# Patient Record
Sex: MALE | Race: WHITE | NOT HISPANIC OR LATINO | Employment: OTHER | URBAN - NONMETROPOLITAN AREA
[De-identification: names, ages, dates, MRNs, and addresses within clinical notes are randomized per-mention and may not be internally consistent; named-entity substitution may affect disease eponyms.]

---

## 2020-09-16 ENCOUNTER — OFFICE VISIT (OUTPATIENT)
Dept: NEUROLOGY | Facility: CLINIC | Age: 60
End: 2020-09-16

## 2020-09-16 VITALS
HEIGHT: 69 IN | WEIGHT: 265 LBS | DIASTOLIC BLOOD PRESSURE: 70 MMHG | BODY MASS INDEX: 39.25 KG/M2 | TEMPERATURE: 97.1 F | HEART RATE: 65 BPM | SYSTOLIC BLOOD PRESSURE: 100 MMHG | OXYGEN SATURATION: 94 %

## 2020-09-16 DIAGNOSIS — F17.210 CIGARETTE SMOKER: ICD-10-CM

## 2020-09-16 DIAGNOSIS — G43.011 INTRACTABLE MIGRAINE WITHOUT AURA AND WITH STATUS MIGRAINOSUS: Primary | ICD-10-CM

## 2020-09-16 DIAGNOSIS — G43.719 INTRACTABLE CHRONIC MIGRAINE WITHOUT AURA AND WITHOUT STATUS MIGRAINOSUS: ICD-10-CM

## 2020-09-16 PROCEDURE — 99214 OFFICE O/P EST MOD 30 MIN: CPT | Performed by: NURSE PRACTITIONER

## 2020-09-16 RX ORDER — FINASTERIDE 5 MG/1
TABLET, FILM COATED ORAL
COMMUNITY
Start: 2020-08-20

## 2020-09-16 RX ORDER — PROPRANOLOL HYDROCHLORIDE 20 MG/1
TABLET ORAL
COMMUNITY
Start: 2020-08-20

## 2020-09-16 RX ORDER — FLUTICASONE PROPIONATE 110 UG/1
1 AEROSOL, METERED RESPIRATORY (INHALATION)
COMMUNITY

## 2020-09-16 RX ORDER — ALBUTEROL SULFATE 2.5 MG/3ML
2.5 SOLUTION RESPIRATORY (INHALATION) EVERY 4 HOURS PRN
COMMUNITY

## 2020-09-16 RX ORDER — GABAPENTIN 800 MG/1
TABLET ORAL
COMMUNITY
Start: 2020-09-10

## 2020-09-16 RX ORDER — CETIRIZINE HYDROCHLORIDE 10 MG/1
TABLET ORAL
COMMUNITY
Start: 2020-09-08

## 2020-09-16 RX ORDER — BLOOD-GLUCOSE METER
KIT MISCELLANEOUS
COMMUNITY
Start: 2020-09-04

## 2020-09-16 RX ORDER — TRAMADOL HYDROCHLORIDE 50 MG/1
TABLET ORAL
COMMUNITY
Start: 2020-09-10

## 2020-09-16 RX ORDER — MONTELUKAST SODIUM 10 MG/1
TABLET ORAL
COMMUNITY
Start: 2020-09-04 | End: 2022-11-07

## 2020-09-16 RX ORDER — CYCLOBENZAPRINE HCL 10 MG
TABLET ORAL
COMMUNITY
Start: 2020-09-04

## 2020-09-16 RX ORDER — TAMSULOSIN HYDROCHLORIDE 0.4 MG/1
CAPSULE ORAL
COMMUNITY
Start: 2020-09-05

## 2020-09-16 RX ORDER — FAMOTIDINE 20 MG/1
TABLET, FILM COATED ORAL
COMMUNITY
Start: 2020-08-20

## 2020-09-16 RX ORDER — DULOXETIN HYDROCHLORIDE 30 MG/1
CAPSULE, DELAYED RELEASE ORAL
COMMUNITY
Start: 2020-09-04

## 2020-09-16 RX ORDER — LUBIPROSTONE 24 UG/1
24 CAPSULE ORAL DAILY
COMMUNITY
End: 2022-11-07

## 2020-09-16 NOTE — PROGRESS NOTES
New Headache Note      Patient Name: Jonnie Blanton  : 1960   MRN: [id number]     Referring Physician: Suma Boyer MD    Chief Complaint:    Chief Complaint   Patient presents with   • Follow-up     Patient in office to follow up on migraines       History of Present Illness: Jonnie Blanton is a 59 y.o. male who is here today to establish care with Neurology for chronic migraines.  He is accompanied by his wife today.  He has had chronic migraines since he was in his 20s.  He was last seen by me at [facility] on 2020 and had his first set of Botox injections.  He had significant improvement in his migraines after the Botox injections.  He had approximately 75% improvement in his migraines after the Botox injections.  He describes his migraines as involving his entire head and accompanied by photophobia and phonophobia.  He has been miserable the past couple of months.  He has had 30/30 headache days in the past month.  He was taking Ubrelvy in the past with some improvement but was unable to get another prescription.  He would like to do Botox injections again.  Additional risk factors- BMI 39, x3 back surgeries, chronic back pain, fibromyalgia, GERD, cigarette smoker, asthma.  [place] sleepiness scale score of 3 today.     Headache days in the last month: 30  Number of migraines in the last month: at least 20  Migraine pattern: varies  Migraine triggers: none identified  Preventative medications tried: Topiramate no improvement, Emgality, Propranolol caused dizziness, Nortriptyline, Aimovig no improvement, Ajovy with some improvement for 30 days following the 90-day injection  Abortive medications tried: Ubrelvy did help, Sumatriptan no improvement, Tramadol, Maxalt no improvement, Tylenol, Ibuprofen, Lortab  Last MRI or CT head:     Pertinent Medical History: Therapy and ESR on 2020 were noncontributory.  He has refused a sleep study in the past, multiple times, even  though he is at high risk for ADOLFO-he says his insurance company will not pay for one.    Procedure-after an informed consent was obtained and the procedure was explained to the patient a total of 165 units of Botox were injected as per protocol- 10 units in 2 sites, procerus 5 units in 1 site, frontalis 20 units in 4 sites, temporalis 40 units in 8 sites, occipitalis 30 units in 6 sites, cervical paraspinal 30 units in 4 sites, trapezius 30 units in 6 sites, total of 165 units and 31 sites.  The patient tolerated the procedure well without complications.  *Due to the severity of the patient's migraines I have administered Botox injections via sample vial.       Subjective      Review of Systems:   Review of Systems   Constitutional: Negative for chills, fatigue, fever, unexpected weight gain and unexpected weight loss.   HENT: Negative for facial swelling, hearing loss, sore throat, swollen glands, tinnitus and trouble swallowing.    Eyes: Negative for blurred vision, double vision, photophobia and visual disturbance.   Respiratory: Negative for cough, chest tightness and shortness of breath.    Cardiovascular: Negative for chest pain, palpitations and leg swelling.   Gastrointestinal: Negative for constipation, diarrhea and nausea.   Endocrine: Negative for cold intolerance and heat intolerance.   Musculoskeletal: Negative for gait problem, neck pain and neck stiffness.   Skin: Negative for color change and rash.   Allergic/Immunologic: Negative for environmental allergies and food allergies.   Neurological: Positive for headache. Negative for dizziness, syncope, facial asymmetry, speech difficulty, weakness, light-headedness, numbness, memory problem and confusion.   Psychiatric/Behavioral: Negative for agitation, behavioral problems, sleep disturbance and depressed mood. The patient is not nervous/anxious.        Past Medical History:   Past Medical History:   Diagnosis Date   • Back pain    •  Difficulty urinating    • Environmental allergies    • Generalized headaches    • Multiple food allergies    • Postnasal drip    • Sinus pressure    • Sleep disturbance    • Tinnitus    • Trouble swallowing        Past Surgical History:   Past Surgical History:   Procedure Laterality Date   • BACK SURGERY     • CARPAL TUNNEL RELEASE      carpal tunnel (right Elbow)    • ULNAR NERVE REPAIR      Ulna Nerve reposition        Family History:   Family History   Problem Relation Age of Onset   • Arthritis Mother    • Cancer Mother    • Hypertension Mother    • Kidney disease Mother    • Thyroid disease Mother    • Diabetes Father    • Heart disease Father    • Hyperlipidemia Father        Social History:   Social History     Socioeconomic History   • Marital status:      Spouse name: Not on file   • Number of children: Not on file   • Years of education: Not on file   • Highest education level: Not on file   Tobacco Use   • Smoking status: Current Every Day Smoker   • Smokeless tobacco: Never Used   Substance and Sexual Activity   • Alcohol use: Never     Frequency: Never   • Drug use: Never   • Sexual activity: Defer       Medications:     Current Outpatient Medications:   •  albuterol (PROVENTIL) (2.5 MG/3ML) 0.083% nebulizer solution, Take 2.5 mg by nebulization Every 4 (Four) Hours As Needed for Wheezing., Disp: , Rfl:   •  Albuterol (VENTOLIN IN), Inhale 90 mcg., Disp: , Rfl:   •  cetirizine (zyrTEC) 10 MG tablet, , Disp: , Rfl:   •  cyclobenzaprine (FLEXERIL) 10 MG tablet, , Disp: , Rfl:   •  DULoxetine (CYMBALTA) 30 MG capsule, , Disp: , Rfl:   •  famotidine (PEPCID) 20 MG tablet, , Disp: , Rfl:   •  finasteride (PROSCAR) 5 MG tablet, , Disp: , Rfl:   •  fluticasone (FLOVENT HFA) 110 MCG/ACT inhaler, Inhale 1 puff 2 (Two) Times a Day., Disp: , Rfl:   •  FREESTYLE LITE test strip, , Disp: , Rfl:   •  gabapentin (NEURONTIN) 800 MG tablet, , Disp: , Rfl:   •  lubiprostone (AMITIZA) 24 MCG capsule, Take 24 mcg  "by mouth Daily., Disp: , Rfl:   •  metFORMIN (GLUCOPHAGE) 500 MG tablet, , Disp: , Rfl:   •  montelukast (SINGULAIR) 10 MG tablet, , Disp: , Rfl:   •  propranolol (INDERAL) 20 MG tablet, , Disp: , Rfl:   •  tamsulosin (FLOMAX) 0.4 MG capsule 24 hr capsule, , Disp: , Rfl:   •  traMADol (ULTRAM) 50 MG tablet, , Disp: , Rfl:     Allergies:   Allergies   Allergen Reactions   • Advil [Ibuprofen] Other (See Comments)     NUMBNESS OF TONGUE AND LIPS.   • Codeine Other (See Comments)     PATIENT STATES NUMBNESS IN TONGUE AND LIPS. VERY HYPER    • Tylenol [Acetaminophen] Other (See Comments)     NUMBNESS OF LIPS AND TOUNGE.       Objective     Physical Exam:  Vital Signs:   Vitals:    09/16/20 1302   BP: 100/70   Pulse: 65   Temp: 97.1 °F (36.2 °C)   SpO2: 94%   Weight: 120 kg (265 lb)   Height: 175.3 cm (69\")   PainSc: 10-Worst pain ever   PainLoc: Generalized     Body mass index is 39.13 kg/m².     Physical Exam  Vitals signs and nursing note reviewed.   Constitutional:       General: He is not in acute distress.     Appearance: He is well-developed. He is not diaphoretic.   HENT:      Head: Normocephalic and atraumatic.   Eyes:      Conjunctiva/sclera: Conjunctivae normal.      Pupils: Pupils are equal, round, and reactive to light.   Neck:      Musculoskeletal: Normal range of motion and neck supple.   Cardiovascular:      Rate and Rhythm: Normal rate and regular rhythm.      Heart sounds: Normal heart sounds. No murmur. No friction rub. No gallop.    Pulmonary:      Effort: Pulmonary effort is normal. No respiratory distress.      Breath sounds: Normal breath sounds. No wheezing or rales.   Musculoskeletal: Normal range of motion.   Skin:     General: Skin is warm and dry.      Findings: No rash.   Neurological:      Mental Status: He is alert and oriented to person, place, and time.      Coordination: Finger-Nose-Finger Test and Romberg Test normal.      Gait: Gait is intact. Tandem walk normal.   Psychiatric:         " Speech: Speech normal.         Behavior: Behavior normal.         Thought Content: Thought content normal.         Neurologic Exam     Mental Status   Oriented to person, place, and time.   Attention: normal. Concentration: normal.   Speech: speech is normal   Level of consciousness: alert  Knowledge: good.     Cranial Nerves   Cranial nerves II through XII intact.     CN II   Visual fields full to confrontation.     CN III, IV, VI   Pupils are equal, round, and reactive to light.  Right pupil: Size: 2 mm. Shape: regular. Reactivity: brisk.   Left pupil: Size: 2 mm. Shape: regular. Reactivity: brisk.   CN III: no CN III palsy  CN VI: no CN VI palsy  Nystagmus: none     CN V   Facial sensation intact.     CN VII   Facial expression full, symmetric.     CN VIII   CN VIII normal.     CN IX, X   CN IX normal.   CN X normal.     CN XI   CN XI normal.     CN XII   CN XII normal.     Motor Exam   Muscle bulk: normal  Overall muscle tone: normal    Strength   Right biceps: 5/5  Left biceps: 5/5  Right triceps: 5/5  Left triceps: 5/5  Right quadriceps: 5/5  Left quadriceps: 5/5  Right hamstrin/5  Left hamstrin/5    Sensory Exam   Light touch normal.     Gait, Coordination, and Reflexes     Gait  Gait: normal    Coordination   Romberg: negative  Finger to nose coordination: normal  Tandem walking coordination: normal    Tremor   Resting tremor: absent  Intention tremor: absent  Action tremor: absentAmbulatory with the assistance of a rolling walker. Able to go from sitting to standing position on first attempt without assistance.        Assessment / Plan      Assessment/Plan:   Jonnie was seen today for follow-up.    Diagnoses and all orders for this visit:    Intractable migraine without aura and with status migrainosus  - Return to the clinic in 3 months for Botox injections.   - Samples of Ubrelvy provided today.     BMI 39.0-39.9,adult  - The patient was counseled on goals and the need for weight reduction. They  were directed to the NIH's website on weight management, (https://www.niddk.nih.gov/health-information/weight-management/health-tips-adults) which addresses the risks of being overweight or obese, a healthy diet, tips for losing weight, and the benefit of physical activity/exercise.         Follow Up:   Return in about 3 months (around 12/16/2020) for Botox.    CASEY Schreiber, FNP-C  [facility]       Please note that portions of this note may have been completed with a voice recognition program. Efforts were made to edit the dictations, but occasionally words are mistranscribed.

## 2020-10-05 ENCOUNTER — TELEPHONE (OUTPATIENT)
Dept: NEUROLOGY | Facility: CLINIC | Age: 60
End: 2020-10-05

## 2020-10-05 NOTE — TELEPHONE ENCOUNTER
PT'S SPOUSE CALLED STATING PT'S UBRELVY RX NEEDS A PA AND SHE WAS CALLING TO CHECK THE STATUS OF THE PA. PLEASE ADVISE      CALL BACK- [phone]

## 2020-10-08 NOTE — TELEPHONE ENCOUNTER
Insurance denied Ubrelvy due to non-formulary on insurance plan. Denial was given to Provider.     Attempted to contact patient's wife to let her know.     No answer when calling patient, Left message for her to return my call.

## 2020-10-20 ENCOUNTER — TELEPHONE (OUTPATIENT)
Dept: NEUROLOGY | Facility: CLINIC | Age: 60
End: 2020-10-20

## 2020-10-20 DIAGNOSIS — G43.011 INTRACTABLE MIGRAINE WITHOUT AURA AND WITH STATUS MIGRAINOSUS: Primary | ICD-10-CM

## 2020-10-20 NOTE — TELEPHONE ENCOUNTER
Patient Ubrelvy was denied due to non-formulary.     Would you like to prescribe something else or do an appeal?

## 2020-12-17 ENCOUNTER — PROCEDURE VISIT (OUTPATIENT)
Dept: NEUROLOGY | Facility: CLINIC | Age: 60
End: 2020-12-17

## 2020-12-17 VITALS
HEART RATE: 58 BPM | SYSTOLIC BLOOD PRESSURE: 128 MMHG | RESPIRATION RATE: 18 BRPM | WEIGHT: 269 LBS | DIASTOLIC BLOOD PRESSURE: 78 MMHG | HEIGHT: 69 IN | OXYGEN SATURATION: 99 % | BODY MASS INDEX: 39.84 KG/M2 | TEMPERATURE: 96.9 F

## 2020-12-17 DIAGNOSIS — G43.719 INTRACTABLE CHRONIC MIGRAINE WITHOUT AURA AND WITHOUT STATUS MIGRAINOSUS: Primary | ICD-10-CM

## 2020-12-17 PROCEDURE — 64615 CHEMODENERV MUSC MIGRAINE: CPT | Performed by: NURSE PRACTITIONER

## 2020-12-17 RX ORDER — MELOXICAM 15 MG/1
15 TABLET ORAL DAILY
COMMUNITY

## 2020-12-17 NOTE — PROGRESS NOTES
Botox Procedure Note       Patient Name: Jonnie Blanton  : 1960   MRN: [id number]     Chief Complaint:    Chief Complaint   Patient presents with   • Procedure     patient in office for botox injections. Patient supplied NDC 7417-1011-19       History of Present Illness: Jonnie Blanton is a 60 y.o. male who is here today for Botox injections for migraines.  He has had some improvement since his last set of injections.  He has only had 8 severe migraines in the past month.  He estimates his improvement to be about 70%.  He has taken Ubrelvy with improvement but his insurance company will not pay for that.  He tried Nurtec without improvement.  He is not a candidate for triptans due to his cardiac history.  He has taken Aimovig in the past and feels he had really good results with that and the Botox injections.     We have discussed risk and benefits of this Botox procedure and common side effects including headache, neck pain, neck stiffness or weakness, ptosis, flu-like symptoms as well as more serious possible adverse effects including possible dysphagia, respiratory distress or even death (death has only been reported once with adults for Botox for migraines in another state when mixed with lidocaine solution which we do not use lidocaine solution in our practice for mixing Botox). The patient verbally understands and accepts risks and agrees with moving forward with Botox injections for chronic migraine prevention.    Verbal and written consent has been obtained from the patient prior to beginning treatment injections.     Subjective      Review of Systems:   Review of Systems   Neurological: Positive for headache.       The following portions of the patient's history were reviewed an updated as appropriate: past family history, past medical history, past social history, past surgical history.     Medications:     Current Outpatient Medications:   •  albuterol (PROVENTIL) (2.5 MG/3ML) 0.083% nebulizer  solution, Take 2.5 mg by nebulization Every 4 (Four) Hours As Needed for Wheezing., Disp: , Rfl:   •  Albuterol (VENTOLIN IN), Inhale 90 mcg., Disp: , Rfl:   •  cetirizine (zyrTEC) 10 MG tablet, , Disp: , Rfl:   •  cyclobenzaprine (FLEXERIL) 10 MG tablet, , Disp: , Rfl:   •  DULoxetine (CYMBALTA) 30 MG capsule, , Disp: , Rfl:   •  famotidine (PEPCID) 20 MG tablet, , Disp: , Rfl:   •  finasteride (PROSCAR) 5 MG tablet, , Disp: , Rfl:   •  fluticasone (FLOVENT HFA) 110 MCG/ACT inhaler, Inhale 1 puff 2 (Two) Times a Day., Disp: , Rfl:   •  FREESTYLE LITE test strip, , Disp: , Rfl:   •  gabapentin (NEURONTIN) 800 MG tablet, , Disp: , Rfl:   •  lubiprostone (AMITIZA) 24 MCG capsule, Take 24 mcg by mouth Daily., Disp: , Rfl:   •  meloxicam (MOBIC) 15 MG tablet, Take 15 mg by mouth Daily., Disp: , Rfl:   •  metFORMIN (GLUCOPHAGE) 500 MG tablet, , Disp: , Rfl:   •  montelukast (SINGULAIR) 10 MG tablet, , Disp: , Rfl:   •  propranolol (INDERAL) 20 MG tablet, , Disp: , Rfl:   •  tamsulosin (FLOMAX) 0.4 MG capsule 24 hr capsule, , Disp: , Rfl:   •  traMADol (ULTRAM) 50 MG tablet, , Disp: , Rfl:   •  Rimegepant Sulfate 75 MG tablet dispersible, Take 75 mg by mouth 1 (One) Time As Needed (migraine) for up to 1 dose., Disp: 14 tablet, Rfl: 3  •  ubrogepant (ubrogepant) 100 MG tablet, Take 1 tablet by mouth As Needed (severe migraine)., Disp: 15 tablet, Rfl: 5  •  ubrogepant (ubrogepant) 100 MG tablet, Take 1 tablet by mouth 1 (One) Time for 1 dose., Disp: 4 tablet, Rfl: 0  •  ubrogepant (ubrogepant) 50 MG tablet, Take 1 tablet by mouth 1 (One) Time for 1 dose., Disp: 4 tablet, Rfl: 0    Allergies:   Allergies   Allergen Reactions   • Advil [Ibuprofen] Other (See Comments)     NUMBNESS OF TONGUE AND LIPS.   • Codeine Other (See Comments)     PATIENT STATES NUMBNESS IN TONGUE AND LIPS. VERY HYPER    • Tylenol [Acetaminophen] Other (See Comments)     NUMBNESS OF LIPS AND TOUNGE.       Objective     Physical Exam:  Vital Signs:    "  Vitals:    12/17/20 1325   BP: 128/78   BP Location: Left arm   Patient Position: Sitting   Cuff Size: Adult   Pulse: 58   Resp: 18   Temp: 96.9 °F (36.1 °C)   SpO2: 99%   Weight: 122 kg (269 lb)   Height: 175.3 cm (69\")     Body mass index is 39.72 kg/m².     Physical Exam  Constitutional:       Appearance: Normal appearance. He is obese.   Pulmonary:      Effort: Pulmonary effort is normal.   Neurological:      Mental Status: He is alert and oriented to person, place, and time.   Psychiatric:         Mood and Affect: Mood normal.         Behavior: Behavior normal.         Thought Content: Thought content normal.         Judgment: Judgment normal.         Neurologic Exam     Mental Status   Oriented to person, place, and time.       BOTOX PROCEDURE:     Date of Last Injection: 9/16/2020  Response: Good  Side Effects: None  : Allergan  Lot #: [id number]  Expiration Date: 08/2023  NDC: *8830-9859-51    200 unit vial Botox was re-constituted with 4 mL 0.9 NS to 5 unit/0.1 mL using standard techniques.  10 units were given at the  muscle in 2 divided sites  5 units were given at the Procerus muscle  20 units were given at the Frontalis muscle in 4 divided sites  40 units were given at the Temporalis muscle in 8 divided sites  30 units were given at the Occipitalis muscle in 6 divided sites  20 units were given at the Cervical paraspinal muscle in 4 divided sites  30 units were given at the Trapezius muscle in 6 divided sites  For a total of 155 units divided between 31 sites and 45 units of wastage      Patient tolerated procedure well with no immediate complications.     Assessment / Plan      Assessment/Plan:   Diagnoses and all orders for this visit:    1. Intractable chronic migraine without aura and without status migrainosus (Primary)    2. BMI 39.0-39.9,adult    Other orders  -     ubrogepant (ubrogepant) 50 MG tablet; Take 1 tablet by mouth 1 (One) Time for 1 dose.  Dispense: 4 tablet; " Refill: 0  -     ubrogepant (ubrogepant) 100 MG tablet; Take 1 tablet by mouth 1 (One) Time for 1 dose.  Dispense: 4 tablet; Refill: 0         Follow Up:   It has been determined that Jonnie Blanton has chronic migraine headaches. We will proceed with a 12 week regimen of 200 units of Botox injections, to treat the patient's chronic migraines.      Return in about 3 months (around 3/17/2021) for Botox.      CASEY Schreiber, FNP-C  [facility]       Please note that portions of this note may have been completed with a voice recognition program. Efforts were made to edit the dictations, but occasionally words are mistranscribed.

## 2021-02-22 DIAGNOSIS — G43.719 INTRACTABLE CHRONIC MIGRAINE WITHOUT AURA AND WITHOUT STATUS MIGRAINOSUS: Primary | ICD-10-CM

## 2021-02-22 RX ORDER — PREDNISONE 20 MG/1
TABLET ORAL
Qty: 18 TABLET | Refills: 0 | Status: SHIPPED | OUTPATIENT
Start: 2021-02-22 | End: 2021-03-06

## 2021-03-10 ENCOUNTER — TELEPHONE (OUTPATIENT)
Dept: NEUROLOGY | Facility: CLINIC | Age: 61
End: 2021-03-10

## 2021-03-10 NOTE — TELEPHONE ENCOUNTER
ATTEMPTED TO CONTACT [employer] UNABLE TO GET THROUGH. ON HOLD FOR 30 MINS.     PATIENT'S BOTOX IS BUY AND BILL DOES NOT NEED TO COME FROM THE PHARMACY.

## 2021-03-10 NOTE — TELEPHONE ENCOUNTER
DELETE AFTER REVIEWING: Telephone encounter to be sent to the clinical pool.    Pharmacy Name:  [facility]    Pharmacy representative name: REESE    Pharmacy representative phone number: [phone]    What medication are you calling in regards to: BOTOX    What question does the pharmacy have: A REFILL REQUEST FOR BOTOX THAT IS SCHEDULED TO BE DELIVERED ON 3-11-21, SHE STATES THE NEEDS A NEW PRESCRIPTION IS NEEDED IN ORDER TO HAVE DELIVERED.     Who is the provider that prescribed the medication: RICH PEÑA        Yes

## 2021-03-18 ENCOUNTER — PROCEDURE VISIT (OUTPATIENT)
Dept: NEUROLOGY | Facility: CLINIC | Age: 61
End: 2021-03-18

## 2021-03-18 VITALS
OXYGEN SATURATION: 97 % | DIASTOLIC BLOOD PRESSURE: 80 MMHG | HEART RATE: 59 BPM | BODY MASS INDEX: 38.51 KG/M2 | HEIGHT: 69 IN | WEIGHT: 260 LBS | SYSTOLIC BLOOD PRESSURE: 120 MMHG | TEMPERATURE: 96.7 F

## 2021-03-18 DIAGNOSIS — G43.719 INTRACTABLE CHRONIC MIGRAINE WITHOUT AURA AND WITHOUT STATUS MIGRAINOSUS: Primary | ICD-10-CM

## 2021-03-18 PROCEDURE — 64615 CHEMODENERV MUSC MIGRAINE: CPT | Performed by: NURSE PRACTITIONER

## 2021-03-18 NOTE — PROGRESS NOTES
Botox Procedure Note       Patient Name: Jonnie Blanton  : 1960   MRN: [id number]     Chief Complaint:    Chief Complaint   Patient presents with   • Procedure     Patient in office for BOTOX injection. Pt supplied NDC # 7479-8866-25       History of Present Illness: Jonnie Blanton is a 60 y.o. male who is here today for Botox injections for migraines.  His last set of Botox injections were on 2020.  He has had about 70% improvement in his migraines since he started Botox therapy.  He feels his migraines are much worse the month before his next set of injections are due.  He has had increased stress level since his younger brother  suddenly earlier this month.  He is taking Ubrelvy 100mg PRN and that helps with his severe migraines.  He tried Emgality in the past and had some improvement during the 2-3 weeks after the injection.  He would like to see if he has even more efficacy with the Emgality with the Botox injections.    *Again I have advised patient his untreated ADOLFO is most likely making his migraines worse.  He is not interested in PAP therapy treatment.     We have discussed risk and benefits of this Botox procedure and common side effects including headache, neck pain, neck stiffness or weakness, ptosis, flu-like symptoms as well as more serious possible adverse effects including possible dysphagia, respiratory distress or even death (death has only been reported once with adults for Botox [place] mig[place]ines [place] when mixed with lidocaine solution which we do not use lidocaine solution in our practice for mixing Botox). The patient verbally understands and accepts risks and agrees with moving forward with Botox injections for chronic migraine prevention.    Verbal and written consent has been obtained from the patient prior to beginning treatment injections.     Subjective      Review of Systems:   Review of Systems    The following portions of the patient's history were  reviewed an updated as appropriate: past family history, past medical history, past social history, past surgical history.     Medications:     Current Outpatient Medications:   •  albuterol (PROVENTIL) (2.5 MG/3ML) 0.083% nebulizer solution, Take 2.5 mg by nebulization Every 4 (Four) Hours As Needed for Wheezing., Disp: , Rfl:   •  Albuterol (VENTOLIN IN), Inhale 90 mcg., Disp: , Rfl:   •  cetirizine (zyrTEC) 10 MG tablet, , Disp: , Rfl:   •  cyclobenzaprine (FLEXERIL) 10 MG tablet, , Disp: , Rfl:   •  DULoxetine (CYMBALTA) 30 MG capsule, , Disp: , Rfl:   •  famotidine (PEPCID) 20 MG tablet, , Disp: , Rfl:   •  finasteride (PROSCAR) 5 MG tablet, , Disp: , Rfl:   •  fluticasone (FLOVENT HFA) 110 MCG/ACT inhaler, Inhale 1 puff 2 (Two) Times a Day., Disp: , Rfl:   •  FREESTYLE LITE test strip, , Disp: , Rfl:   •  gabapentin (NEURONTIN) 800 MG tablet, , Disp: , Rfl:   •  lubiprostone (AMITIZA) 24 MCG capsule, Take 24 mcg by mouth Daily., Disp: , Rfl:   •  meloxicam (MOBIC) 15 MG tablet, Take 15 mg by mouth Daily., Disp: , Rfl:   •  metFORMIN (GLUCOPHAGE) 500 MG tablet, , Disp: , Rfl:   •  montelukast (SINGULAIR) 10 MG tablet, , Disp: , Rfl:   •  propranolol (INDERAL) 20 MG tablet, , Disp: , Rfl:   •  Rimegepant Sulfate 75 MG tablet dispersible, Take 75 mg by mouth 1 (One) Time As Needed (migraine) for up to 1 dose., Disp: 14 tablet, Rfl: 3  •  tamsulosin (FLOMAX) 0.4 MG capsule 24 hr capsule, , Disp: , Rfl:   •  traMADol (ULTRAM) 50 MG tablet, , Disp: , Rfl:   •  ubrogepant (ubrogepant) 100 MG tablet, Take 1 tablet by mouth As Needed (severe migraine)., Disp: 15 tablet, Rfl: 5  •  galcanezumab-gnlm (EMGALITY) 120 MG/ML prefilled syringe, Inject 1 mL under the skin into the appropriate area as directed 1 (One) Time for 1 dose., Disp: 1.12 mL, Rfl: 0    Allergies:   Allergies   Allergen Reactions   • Advil [Ibuprofen] Other (See Comments)     NUMBNESS OF TONGUE AND LIPS.   • Codeine Other (See Comments)     PATIENT  "STATES NUMBNESS IN TONGUE AND LIPS. VERY HYPER    • Tylenol [Acetaminophen] Other (See Comments)     NUMBNESS OF LIPS AND TOUNGE.       Objective     Physical Exam:  Vital Signs:   Vitals:    03/18/21 1439   BP: 120/80   BP Location: Left arm   Patient Position: Sitting   Cuff Size: Adult   Pulse: 59   Temp: 96.7 °F (35.9 °C)   SpO2: 97%   Weight: 118 kg (260 lb)   Height: 175.3 cm (69\")   PainSc: 10-Worst pain ever   PainLoc: Generalized     Body mass index is 38.4 kg/m².     Physical Exam    Neurologic Exam    BOTOX PROCEDURE:     Date of Last Injection: 12/17/2020  Response: Good  Side Effects: None  : [employer]  Lot #:  [id number]  Expiration Date: 10/2023  NDC: 0046-4072-10    200 unit vial Botox was re-constituted with 4 mL 0.9 NS to 5 unit/0.1 mL using standard techniques.  10 units were given at the  muscle in 2 divided sites  5 units were given at the Procerus muscle  20 units were given at the Frontalis muscle in 4 divided sites  40 units were given at the Temporalis muscle in 8 divided sites  30 units were given at the Occipitalis muscle in 6 divided sites  20 units were given at the Cervical paraspinal muscle in 4 divided sites  30 units were given at the Trapezius muscle in 6 divided sites  An additional 7.5 units were given in both masseter muscles for a total of 170 units divided between 33 sites and 30 units of waste.   For a total of 155 units divided between 31 sites and 45 units of wastage    Patient tolerated procedure well with no immediate complications.     Assessment / Plan      Assessment/Plan:   Diagnoses and all orders for this visit:    1. Intractable chronic migraine without aura and without status migrainosus (Primary)    Other orders  -     galcanezumab-gnlm (EMGALITY) 120 MG/ML prefilled syringe; Inject 1 mL under the skin into the appropriate area as directed 1 (One) Time for 1 dose.  Dispense: 1.12 mL; Refill: 0-Loading dose of Emgality today along with additional " samples for 1ml injections every 4 weeks.   - Samples of Ubrelvy 100mg provided today-his insurance company will not pay for this medication and it is the only PRN medication that works well for his migraines.        Follow Up:   It has been determined that Mateusz Blanton has chronic migraine headaches. We will proceed with a 12 week regimen of 200 units of Botox injections, to treat the patient's chronic migraines.      Return in about 3 months (around 6/18/2021) for Botox.      CASEY Schreiber, FNP-C  [facility] Neurology and Sleep Medicine       Please note that portions of this note may have been completed with a voice recognition program. Efforts were made to edit the dictations, but occasionally words are mistranscribed.

## 2021-06-24 ENCOUNTER — PROCEDURE VISIT (OUTPATIENT)
Dept: NEUROLOGY | Facility: CLINIC | Age: 61
End: 2021-06-24

## 2021-06-24 VITALS
WEIGHT: 255.6 LBS | HEIGHT: 69 IN | SYSTOLIC BLOOD PRESSURE: 130 MMHG | DIASTOLIC BLOOD PRESSURE: 80 MMHG | OXYGEN SATURATION: 96 % | BODY MASS INDEX: 37.86 KG/M2 | HEART RATE: 57 BPM | TEMPERATURE: 97.1 F

## 2021-06-24 DIAGNOSIS — G43.719 INTRACTABLE CHRONIC MIGRAINE WITHOUT AURA AND WITHOUT STATUS MIGRAINOSUS: Primary | ICD-10-CM

## 2021-06-24 PROCEDURE — 64615 CHEMODENERV MUSC MIGRAINE: CPT | Performed by: NURSE PRACTITIONER

## 2021-06-24 NOTE — PROGRESS NOTES
Botox Procedure Note       Patient Name: Jonnie Blanton  : 1960   MRN: [id number]     Chief Complaint:    Chief Complaint   Patient presents with   • Procedure     patient in office for botox injections. Pt supplied NDC # 2267-2473-76       History of Present Illness: Jonnie Blanton is a 60 y.o. male who is here today for Botox injections for migraines.  His last set on injections were on 3/18/2021.  He has had about 75% improvement in his migraines since starting Botox therapy.  He is also taking Emgality 140mg monthly and thinks that has contributed to the improvement in his migraines.  He is taking Ubrelvy 100mg samples as needed-his insurance company refused to cover this medication.  He has only had 4 severe migraines since his last visit.      We have discussed risk and benefits of this Botox procedure and common side effects including headache, neck pain, neck stiffness or weakness, ptosis, flu-like symptoms as well as more serious possible adverse effects including possible dysphagia, respiratory distress or even death (death has only been reported once with adults for Botox [place] mig[place]ines [place] when mixed with lidocaine solution which we do not use lidocaine solution in our practice for mixing Botox). The patient verbally understands and accepts risks and agrees with moving forward with Botox injections for chronic migraine prevention.    Verbal and written consent has been obtained from the patient prior to beginning treatment injections.       Subjective      Review of Systems:   Review of Systems    The following portions of the patient's history were reviewed an updated as appropriate: past family history, past medical history, past social history, past surgical history.     Medications:     Current Outpatient Medications:   •  albuterol (PROVENTIL) (2.5 MG/3ML) 0.083% nebulizer solution, Take 2.5 mg by nebulization Every 4 (Four) Hours As Needed for Wheezing., Disp: , Rfl:   •   Albuterol (VENTOLIN IN), Inhale 90 mcg., Disp: , Rfl:   •  cetirizine (zyrTEC) 10 MG tablet, , Disp: , Rfl:   •  cyclobenzaprine (FLEXERIL) 10 MG tablet, , Disp: , Rfl:   •  DULoxetine (CYMBALTA) 30 MG capsule, , Disp: , Rfl:   •  famotidine (PEPCID) 20 MG tablet, , Disp: , Rfl:   •  finasteride (PROSCAR) 5 MG tablet, , Disp: , Rfl:   •  fluticasone (FLOVENT HFA) 110 MCG/ACT inhaler, Inhale 1 puff 2 (Two) Times a Day., Disp: , Rfl:   •  FREESTYLE LITE test strip, , Disp: , Rfl:   •  gabapentin (NEURONTIN) 800 MG tablet, , Disp: , Rfl:   •  lubiprostone (AMITIZA) 24 MCG capsule, Take 24 mcg by mouth Daily., Disp: , Rfl:   •  meloxicam (MOBIC) 15 MG tablet, Take 15 mg by mouth Daily., Disp: , Rfl:   •  metFORMIN (GLUCOPHAGE) 500 MG tablet, , Disp: , Rfl:   •  montelukast (SINGULAIR) 10 MG tablet, , Disp: , Rfl:   •  propranolol (INDERAL) 20 MG tablet, , Disp: , Rfl:   •  Rimegepant Sulfate 75 MG tablet dispersible, Take 75 mg by mouth 1 (One) Time As Needed (migraine) for up to 1 dose., Disp: 14 tablet, Rfl: 3  •  tamsulosin (FLOMAX) 0.4 MG capsule 24 hr capsule, , Disp: , Rfl:   •  traMADol (ULTRAM) 50 MG tablet, , Disp: , Rfl:   •  ubrogepant (ubrogepant) 100 MG tablet, Take 1 tablet by mouth As Needed (severe migraine)., Disp: 15 tablet, Rfl: 5  •  galcanezumab-gnlm (EMGALITY) 120 MG/ML auto-injector pen, Inject 2 mL under the skin into the appropriate area as directed 1 (One) Time for 1 dose., Disp: 2.48 mL, Rfl: 0  •  ubrogepant (ubrogepant) 100 MG tablet, Take 1 tablet by mouth 1 (One) Time for 1 dose., Disp: 3 tablet, Rfl: 0  •  ubrogepant (ubrogepant) 50 MG tablet, Take 1 tablet by mouth 1 (One) Time for 1 dose., Disp: 3 tablet, Rfl: 0  No current facility-administered medications for this visit.    Allergies:   Allergies   Allergen Reactions   • Advil [Ibuprofen] Other (See Comments)     NUMBNESS OF TONGUE AND LIPS.   • Codeine Other (See Comments)     PATIENT STATES NUMBNESS IN TONGUE AND LIPS. VERY HYPER   "  • Tylenol [Acetaminophen] Other (See Comments)     NUMBNESS OF LIPS AND TOUNGE.       Objective     Physical Exam:  Vital Signs:   Vitals:    06/24/21 1318   BP: 130/80   BP Location: Left arm   Patient Position: Sitting   Cuff Size: Adult   Pulse: 57   Temp: 97.1 °F (36.2 °C)   SpO2: 96%   Weight: 116 kg (255 lb 9.6 oz)   Height: 175.3 cm (69\")   PainSc: 10-Worst pain ever   PainLoc: Generalized     Body mass index is 37.75 kg/m².     Physical Exam  Constitutional:       Appearance: He is obese.   Pulmonary:      Effort: Pulmonary effort is normal. No respiratory distress.   Neurological:      Mental Status: He is alert and oriented to person, place, and time.   Psychiatric:         Mood and Affect: Mood normal.         Behavior: Behavior normal.         Thought Content: Thought content normal.         Judgment: Judgment normal.         Neurologic Exam     Mental Status   Oriented to person, place, and time.       BOTOX PROCEDURE:     Date of Last Injection: 3/18/2021  Response: Good  Side Effects: None  : [employer]  Lot #: [id number]  Expiration Date: 02/01/2021  NDC: 1366017858    200 unit vial Botox was re-constituted with 4 mL 0.9 NS to 5 unit/0.1 mL using standard techniques.  10 units were given at the  muscle in 2 divided sites  5 units were given at the Procerus muscle  20 units were given at the Frontalis muscle in 4 divided sites  40 units were given at the Temporalis muscle in 8 divided sites  30 units were given at the Occipitalis muscle in 6 divided sites  20 units were given at the Cervical paraspinal muscle in 4 divided sites  30 units were given at the Trapezius muscle in 6 divided sites  For a total of 155 units divided between 31 sites and 45 units of wastage      Patient tolerated procedure well with no immediate complications.     Assessment / Plan      Assessment/Plan:   Diagnoses and all orders for this visit:    1. Intractable chronic migraine without aura and without " status migrainosus (Primary)  -     OnabotulinumtoxinA 200 Units  -     galcanezumab-gnlm (EMGALITY) 120 MG/ML auto-injector pen; Inject 2 mL under the skin into the appropriate area as directed 1 (One) Time for 1 dose.  Dispense: 2.48 mL; Refill: 0  -     ubrogepant (ubrogepant) 100 MG tablet; Take 1 tablet by mouth 1 (One) Time for 1 dose.  Dispense: 3 tablet; Refill: 0  -     ubrogepant (ubrogepant) 50 MG tablet; Take 1 tablet by mouth 1 (One) Time for 1 dose.  Dispense: 3 tablet; Refill: 0    2. BMI 37.0-37.9, adult       Follow Up:   It has been determined that Jonnie Blanton has chronic migraine headaches. We will proceed with a 12 week regimen of 200 units of Botox injections, to treat the patient's chronic migraines.      Return in about 3 months (around 9/24/2021) for Botox.      CASEY Schreiber, FNP-C  [facility] Neurology and Sleep Medicine       Please note that portions of this note may have been completed with a voice recognition program. Efforts were made to edit the dictations, but occasionally words are mistranscribed.

## 2021-08-11 DIAGNOSIS — G43.719 INTRACTABLE CHRONIC MIGRAINE WITHOUT AURA AND WITHOUT STATUS MIGRAINOSUS: Primary | ICD-10-CM

## 2021-08-11 RX ORDER — PREDNISONE 20 MG/1
20 TABLET ORAL 2 TIMES DAILY
Qty: 10 TABLET | Refills: 0 | Status: SHIPPED | OUTPATIENT
Start: 2021-08-11 | End: 2022-11-07

## 2021-09-03 ENCOUNTER — TELEPHONE (OUTPATIENT)
Dept: NEUROLOGY | Facility: CLINIC | Age: 61
End: 2021-09-03

## 2021-09-03 NOTE — TELEPHONE ENCOUNTER
Spoke with [facility]. Botox will be delivered on September 8th.

## 2021-09-30 ENCOUNTER — PROCEDURE VISIT (OUTPATIENT)
Dept: NEUROLOGY | Facility: CLINIC | Age: 61
End: 2021-09-30

## 2021-09-30 VITALS
TEMPERATURE: 97.7 F | HEIGHT: 69 IN | HEART RATE: 65 BPM | DIASTOLIC BLOOD PRESSURE: 70 MMHG | OXYGEN SATURATION: 92 % | BODY MASS INDEX: 37.71 KG/M2 | SYSTOLIC BLOOD PRESSURE: 122 MMHG | WEIGHT: 254.6 LBS

## 2021-09-30 DIAGNOSIS — G43.719 INTRACTABLE CHRONIC MIGRAINE WITHOUT AURA AND WITHOUT STATUS MIGRAINOSUS: Primary | ICD-10-CM

## 2021-09-30 PROCEDURE — 64615 CHEMODENERV MUSC MIGRAINE: CPT | Performed by: NURSE PRACTITIONER

## 2021-09-30 NOTE — PROGRESS NOTES
Botox Procedure Note       Patient Name: Jonnie Blanton  : 1960   MRN: [id number]     Chief Complaint:    Chief Complaint   Patient presents with   • Botulinum Toxin Injection     migraines       History of Present Illness: Jonnie Blanton is a 60 y.o. male who is here today for Botox injections for migraines.  His last set of injections were on 2021.  He has had a 75% improvement in his chronic migraines since starting Botox therapy.  He feels he clenches his jaw quite a bit and Botox injections into his masseter muscles did help in the past.  He is taking Emgality samples monthly-his insurance company will not pay for these.  He is taking Ubrelvy samples also because his insurance will not pay for them.      We have discussed risk and benefits of this Botox procedure and common side effects including headache, neck pain, neck stiffness or weakness, ptosis, flu-like symptoms as well as more serious possible adverse effects including possible dysphagia, respiratory distress or even death (death has only been reported once with adults for Botox [place]ines [place] when mixed with lidocaine solution which we do not use lidocaine solution in our practice for mixing Botox). The patient verbally understands and accepts risks and agrees with moving forward with Botox injections for chronic migraine prevention.    Verbal and written consent has been obtained from the patient prior to beginning treatment injections.     Subjective      Review of Systems:   Review of Systems    The following portions of the patient's history were reviewed an updated as appropriate: past family history, past medical history, past social history, past surgical history.     Medications:     Current Outpatient Medications:   •  albuterol (PROVENTIL) (2.5 MG/3ML) 0.083% nebulizer solution, Take 2.5 mg by nebulization Every 4 (Four) Hours As Needed for Wheezing., Disp: , Rfl:   •  Albuterol (VENTOLIN IN), Inhale 90 mcg.,  Disp: , Rfl:   •  cetirizine (zyrTEC) 10 MG tablet, , Disp: , Rfl:   •  cyclobenzaprine (FLEXERIL) 10 MG tablet, , Disp: , Rfl:   •  DULoxetine (CYMBALTA) 30 MG capsule, , Disp: , Rfl:   •  famotidine (PEPCID) 20 MG tablet, , Disp: , Rfl:   •  finasteride (PROSCAR) 5 MG tablet, , Disp: , Rfl:   •  fluticasone (FLOVENT HFA) 110 MCG/ACT inhaler, Inhale 1 puff 2 (Two) Times a Day., Disp: , Rfl:   •  FREESTYLE LITE test strip, , Disp: , Rfl:   •  gabapentin (NEURONTIN) 800 MG tablet, , Disp: , Rfl:   •  glucose blood test strip, FreeStyle Lite Strips, Disp: , Rfl:   •  meloxicam (MOBIC) 15 MG tablet, Take 15 mg by mouth Daily., Disp: , Rfl:   •  metFORMIN (GLUCOPHAGE) 500 MG tablet, , Disp: , Rfl:   •  montelukast (SINGULAIR) 10 MG tablet, , Disp: , Rfl:   •  onabotulinumtoxina (BOTOX) 100 units reconstituted solution injection, Inject as directed., Disp: , Rfl:   •  predniSONE (DELTASONE) 20 MG tablet, Take 1 tablet by mouth 2 (Two) Times a Day., Disp: 10 tablet, Rfl: 0  •  propranolol (INDERAL) 20 MG tablet, , Disp: , Rfl:   •  Rimegepant Sulfate 75 MG tablet dispersible, Take 75 mg by mouth 1 (One) Time As Needed (migraine) for up to 1 dose., Disp: 14 tablet, Rfl: 3  •  tamsulosin (FLOMAX) 0.4 MG capsule 24 hr capsule, , Disp: , Rfl:   •  traMADol (ULTRAM) 50 MG tablet, , Disp: , Rfl:   •  ubrogepant (ubrogepant) 100 MG tablet, Take 1 tablet by mouth As Needed (severe migraine)., Disp: 15 tablet, Rfl: 5  •  lubiprostone (AMITIZA) 24 MCG capsule, Take 24 mcg by mouth Daily., Disp: , Rfl:     Allergies:   Allergies   Allergen Reactions   • Advil [Ibuprofen] Other (See Comments)     NUMBNESS OF TONGUE AND LIPS.   • Codeine Other (See Comments)     PATIENT STATES NUMBNESS IN TONGUE AND LIPS. VERY HYPER    • Tylenol [Acetaminophen] Other (See Comments)     NUMBNESS OF LIPS AND TOUNGE.       Objective     Physical Exam:  Vital Signs:   Vitals:    09/30/21 1423   BP: 122/70   Pulse: 65   Temp: 97.7 °F (36.5 °C)   SpO2: 92%  "  Weight: 115 kg (254 lb 9.6 oz)   Height: 175.3 cm (69\")   PainSc: 10-Worst pain ever     Body mass index is 37.6 kg/m².     Physical Exam  Constitutional:       Appearance: He is obese.   Pulmonary:      Effort: Pulmonary effort is normal. No respiratory distress.   Neurological:      Mental Status: He is alert and oriented to person, place, and time.   Psychiatric:         Mood and Affect: Mood normal.         Behavior: Behavior normal.         Thought Content: Thought content normal.         Judgment: Judgment normal.         Neurologic Exam     Mental Status   Oriented to person, place, and time.       BOTOX PROCEDURE:     Date of Last Injection: 6/24/2021  Response: Good  Side Effects: None  : [employer]  Lot #: [id number]  Expiration Date: 04/01/2024  NDC: 5309-2349-53    200 unit vial Botox was re-constituted with 4 mL 0.9 NS to 5 unit/0.1 mL using standard techniques.  10 units were given at the  muscle in 2 divided sites  5 units were given at the Procerus muscle  20 units were given at the Frontalis muscle in 4 divided sites  40 units were given at the Temporalis muscle in 8 divided sites  30 units were given at the Occipitalis muscle in 6 divided sites  20 units were given at the Cervical paraspinal muscle in 4 divided sites  30 units were given at the Trapezius muscle in 6 divided sites  10 units were given at the Masseter muscles in 2 divided sites.   For a total of 165 units divided between 33 sites and 35 units of wastage    Follow the pain: Additional units  30 units (15 units bilat) were given at the Orbicularis oculi  5 units additional Frontalis  10 units additional at the Temporalis muscle  For a total of 200 units 0 units wastage.      Patient tolerated procedure well with no immediate complications.     Assessment / Plan      Assessment/Plan:   Diagnoses and all orders for this visit:    1. Intractable chronic migraine without aura and without status migrainosus " (Primary)    2. BMI 37.0-37.9, adult       Follow Up:   It has been determined that Jonnie Blanton has chronic migraine headaches. We will proceed with a 12 week regimen of 200 units of Botox injections, to treat the patient's chronic migraines.      Return in about 3 months (around 12/30/2021) for Botox.      CASEY Schreiber, FNP-C  [facility] Neurology and Sleep Medicine       Please note that portions of this note may have been completed with a voice recognition program. Efforts were made to edit the dictations, but occasionally words are mistranscribed.

## 2021-11-30 ENCOUNTER — TELEPHONE (OUTPATIENT)
Dept: NEUROLOGY | Facility: OTHER | Age: 61
End: 2021-11-30

## 2021-11-30 NOTE — TELEPHONE ENCOUNTER
HAYLEY FROM [facility] CALLING FOR PA ON BOTOX.  WHILE ON LINE, SHE FOUND APPROVAL FOR BOTOX.

## 2021-12-30 ENCOUNTER — PROCEDURE VISIT (OUTPATIENT)
Dept: NEUROLOGY | Facility: CLINIC | Age: 61
End: 2021-12-30

## 2021-12-30 VITALS
BODY MASS INDEX: 37.62 KG/M2 | DIASTOLIC BLOOD PRESSURE: 80 MMHG | TEMPERATURE: 97 F | HEART RATE: 54 BPM | WEIGHT: 254 LBS | SYSTOLIC BLOOD PRESSURE: 140 MMHG | OXYGEN SATURATION: 98 % | HEIGHT: 69 IN

## 2021-12-30 DIAGNOSIS — G43.719 INTRACTABLE CHRONIC MIGRAINE WITHOUT AURA AND WITHOUT STATUS MIGRAINOSUS: Primary | ICD-10-CM

## 2021-12-30 PROCEDURE — 64615 CHEMODENERV MUSC MIGRAINE: CPT | Performed by: NURSE PRACTITIONER

## 2021-12-30 RX ORDER — ROSUVASTATIN CALCIUM 10 MG/1
TABLET, COATED ORAL
COMMUNITY
Start: 2021-12-17

## 2021-12-30 RX ORDER — AZELASTINE HYDROCHLORIDE 137 UG/1
SPRAY, METERED NASAL
COMMUNITY
Start: 2021-10-25

## 2021-12-30 NOTE — PROGRESS NOTES
Botox Procedure Note       Patient Name: Jonnie Blanton  : 1960   MRN: [id number]     Chief Complaint:    Chief Complaint   Patient presents with   • Procedure     Pt in office for botox.. NDC.. 0345-6924-26 Pt supplied       History of Present Illness: Jonnie Blanton is a 61 y.o. male who is here today for Botox injections for migraines and his last set of injections were on 2021.  He had about 30% improvement in his migraines over the past 3 months.  He has had as much as 75-80% improvement in his migraines, with Botox injections, in the past.  He feels the Botox injections wear off about 6-8 weeks after his Botox administration.  He had a migraine that lasted for 4 days last week.  Currently on Emgality injection monthly (using samples).  He is using samples of Ubrelvy as abortive therapy.     We have discussed risk and benefits of this Botox procedure and common side effects including headache, neck pain, neck stiffness or weakness, ptosis, flu-like symptoms as well as more serious possible adverse effects including possible dysphagia, respiratory distress or even death (death has only been reported once with adults for Botox for migraines in another state when mixed with lidocaine solution which we do not use lidocaine solution in our practice for mixing Botox). The patient verbally understands and accepts risks and agrees with moving forward with Botox injections for chronic migraine prevention.    Verbal and written consent has been obtained from the patient prior to beginning treatment injections.     Subjective      Review of Systems:   Review of Systems    The following portions of the patient's history were reviewed an updated as appropriate: past family history, past medical history, past social history, past surgical history.     Medications:     Current Outpatient Medications:   •  albuterol (PROVENTIL) (2.5 MG/3ML) 0.083% nebulizer solution, Take 2.5 mg by nebulization Every 4 (Four)  Hours As Needed for Wheezing., Disp: , Rfl:   •  Albuterol (VENTOLIN IN), Inhale 90 mcg., Disp: , Rfl:   •  Azelastine HCl 137 MCG/SPRAY solution, , Disp: , Rfl:   •  cetirizine (zyrTEC) 10 MG tablet, , Disp: , Rfl:   •  cyclobenzaprine (FLEXERIL) 10 MG tablet, , Disp: , Rfl:   •  diclofenac (VOLTAREN) 50 MG EC tablet, diclofenac sodium 50 mg tablet,delayed release, Disp: , Rfl:   •  DULoxetine (CYMBALTA) 30 MG capsule, , Disp: , Rfl:   •  famotidine (PEPCID) 20 MG tablet, , Disp: , Rfl:   •  finasteride (PROSCAR) 5 MG tablet, , Disp: , Rfl:   •  fluticasone (FLOVENT HFA) 110 MCG/ACT inhaler, Inhale 1 puff 2 (Two) Times a Day., Disp: , Rfl:   •  FREESTYLE LITE test strip, , Disp: , Rfl:   •  gabapentin (NEURONTIN) 800 MG tablet, , Disp: , Rfl:   •  glucose blood test strip, FreeStyle Lite Strips, Disp: , Rfl:   •  lubiprostone (AMITIZA) 24 MCG capsule, Take 24 mcg by mouth Daily., Disp: , Rfl:   •  meloxicam (MOBIC) 15 MG tablet, Take 15 mg by mouth Daily., Disp: , Rfl:   •  metFORMIN (GLUCOPHAGE) 500 MG tablet, , Disp: , Rfl:   •  montelukast (SINGULAIR) 10 MG tablet, , Disp: , Rfl:   •  onabotulinumtoxina (BOTOX) 100 units reconstituted solution injection, Inject as directed., Disp: , Rfl:   •  predniSONE (DELTASONE) 20 MG tablet, Take 1 tablet by mouth 2 (Two) Times a Day., Disp: 10 tablet, Rfl: 0  •  propranolol (INDERAL) 20 MG tablet, , Disp: , Rfl:   •  Rimegepant Sulfate 75 MG tablet dispersible, Take 75 mg by mouth 1 (One) Time As Needed (migraine) for up to 1 dose., Disp: 14 tablet, Rfl: 3  •  rosuvastatin (CRESTOR) 10 MG tablet, , Disp: , Rfl:   •  tamsulosin (FLOMAX) 0.4 MG capsule 24 hr capsule, , Disp: , Rfl:   •  traMADol (ULTRAM) 50 MG tablet, , Disp: , Rfl:   •  ubrogepant (ubrogepant) 100 MG tablet, Take 1 tablet by mouth As Needed (severe migraine)., Disp: 15 tablet, Rfl: 5  •  ubrogepant (ubrogepant) 100 MG tablet, Take 1 tablet by mouth As Needed (as needed)., Disp: 2 tablet, Rfl: 0  •   "ubrogepant (ubrogepant) 50 MG tablet, Take 1 tablet by mouth As Needed (as needed)., Disp: 6 tablet, Rfl: 0  No current facility-administered medications for this visit.    Allergies:   Allergies   Allergen Reactions   • Advil [Ibuprofen] Other (See Comments)     NUMBNESS OF TONGUE AND LIPS.   • Codeine Other (See Comments)     PATIENT STATES NUMBNESS IN TONGUE AND LIPS. VERY HYPER    • Tylenol [Acetaminophen] Other (See Comments)     NUMBNESS OF LIPS AND TOUNGE.   • Banana Rash     Numbness of tongue and lips   • Citric Acid Unknown (See Comments)   • Latex Rash   • Meperidine Other (See Comments)     Becomes very hyper; causes numbness of tongue       Objective     Physical Exam:  Vital Signs:   Vitals:    12/30/21 1518   BP: 140/80   Pulse: 54   Temp: 97 °F (36.1 °C)   SpO2: 98%   Weight: 115 kg (254 lb)   Height: 175.3 cm (69\")   PainSc: 10-Worst pain ever     Body mass index is 37.51 kg/m².     Physical Exam    Neurologic Exam    BOTOX PROCEDURE:     Date of Last Injection: 9/30/2021  Response: Good   Side Effects: None  : Allergan  Lot #: [id number]  Expiration Date: 06/2024  NDC: 0463-6150-62    200 unit vial Botox was re-constituted with 4 mL 0.9 NS to 5 unit/0.1 mL using standard techniques.  10 units were given at the  muscle in 2 divided sites  5 units were given at the Procerus muscle  20 units were given at the Frontalis muscle in 4 divided sites  40 units were given at the Temporalis muscle in 8 divided sites  30 units were given at the Occipitalis muscle in 6 divided sites  20 units were given at the Cervical paraspinal muscle in 4 divided sites  30 units were given at the Trapezius muscle in 6 divided sites  10 units were given at the Masseter muscles in 2 divided sites.  For a total of 165 units divided between 33 sites and 35 units of wastage    Follow the pain: Additional units  30 units (15 units bilat) were given at the Orbicularis oculi  5 units additional Frontalis  10 units " additional at the Temporalis muscle  For a total of 200 units 0 units wastage.      Patient tolerated procedure well with no immediate complications.     Assessment / Plan      Assessment/Plan:   Diagnoses and all orders for this visit:    1. Intractable chronic migraine without aura and without status migrainosus (Primary)  -     OnabotulinumtoxinA 200 Units    Other orders  -     ubrogepant (ubrogepant) 50 MG tablet; Take 1 tablet by mouth As Needed (as needed).  Dispense: 6 tablet; Refill: 0  -     ubrogepant (ubrogepant) 100 MG tablet; Take 1 tablet by mouth As Needed (as needed).  Dispense: 2 tablet; Refill: 0       Follow Up:   It has been determined that Jonnie has chronic migraine headaches. We will proceed with a 12 week regimen of 200 units of Botox injections, to treat the patient's chronic migraines.      Return in about 3 months (around 3/30/2022) for Botox.      CASEY Schreiber, FNP-C  [facility]       Please note that portions of this note may have been completed with a voice recognition program. Efforts were made to edit the dictations, but occasionally words are mistranscribed.

## 2022-03-16 ENCOUNTER — TELEPHONE (OUTPATIENT)
Dept: NEUROLOGY | Facility: CLINIC | Age: 62
End: 2022-03-16

## 2022-03-16 NOTE — TELEPHONE ENCOUNTER
JULIETTE REACHED OUT TO PATIENT ON MARCH 12TH.  PHARMACY STATED PATIENT STATED HE WOULD CALL THEM BACK.     CALLED AND SPOKE WITH PATIENT TO LET HIM KNOW THAT HE NEEDS TO CONTACT [facility] AND PAY THE CO-PAYMENT ON THE BOTOX MEDICATION.     I LET PATENT KNOW THAT IF HE DOES NOT PAY THE CO-PAYMENT WE WILL NOT BE ABLE TO DO HIS BOTOX INJECTIONS NEXT WEEK. WE HAVE NO OTHER WAY TO GET HIS MEDICATION.     PATIENT STATED HE WOULD CONTACT HIS INSURANCE AND FIND OUT BECAUSE HE HAS NEVER HAD A CO-PAYMENT BEFORE.    TOLD PATIENT TO CALL US BACK AND LET US KNOW WHAT HE FINDS OUT SO WE KNOW WHAT TO DO WITH HIS APPOINTMENT NEXT WEEK.

## 2022-07-07 RX ORDER — ATOGEPANT 60 MG/1
60 TABLET ORAL DAILY
Qty: 16 TABLET | Refills: 0 | COMMUNITY
Start: 2022-07-07

## 2022-10-26 ENCOUNTER — TELEPHONE (OUTPATIENT)
Dept: NEUROLOGY | Facility: CLINIC | Age: 62
End: 2022-10-26

## 2022-10-26 NOTE — TELEPHONE ENCOUNTER
PATIENTS INSURANCE NEEDS A PA FOR QULIPTA. THEY ARE NEEDING UPDATED DOCUMENTATION OF HEADACHE FREQUENCY SINCE STARTING QULIPTA. OK TO SCHEDULE PATIENT FOR A FOLLOW UP WITH YOU OR WILL PATIENT NEED TO TRANSFER TO [place] FOR MIGRAINE CARE?

## 2022-10-26 NOTE — TELEPHONE ENCOUNTER
Can you call him and make sure he is even still taking it and it is helping. If he is, then you can schedule a follow up with me. Thanks.

## 2022-10-27 NOTE — TELEPHONE ENCOUNTER
Caller: ABY    Relationship: WIFE    Best call back number: [phone]    Who are you requesting to speak with (clinical staff, provider,  specific staff member): RICH    What was the call regarding:   PT HAS BEEN TAKING THE QULIPTA FOR ABOUT SEVERAL MONTHS AS DIRECTED.

## 2022-11-07 ENCOUNTER — OFFICE VISIT (OUTPATIENT)
Dept: NEUROLOGY | Facility: CLINIC | Age: 62
End: 2022-11-07

## 2022-11-07 VITALS — OXYGEN SATURATION: 97 % | RESPIRATION RATE: 12 BRPM | HEART RATE: 54 BPM | TEMPERATURE: 98.1 F

## 2022-11-07 DIAGNOSIS — G43.719 INTRACTABLE CHRONIC MIGRAINE WITHOUT AURA AND WITHOUT STATUS MIGRAINOSUS: Primary | ICD-10-CM

## 2022-11-07 PROCEDURE — 99214 OFFICE O/P EST MOD 30 MIN: CPT | Performed by: NURSE PRACTITIONER

## 2022-11-07 RX ORDER — ATOGEPANT 30 MG/1
30 TABLET ORAL DAILY
Qty: 28 TABLET | Refills: 0 | COMMUNITY
Start: 2022-11-07 | End: 2023-02-06 | Stop reason: SDUPTHER

## 2022-11-07 RX ORDER — ALBUTEROL SULFATE 90 UG/1
2 AEROSOL, METERED RESPIRATORY (INHALATION) EVERY 4 HOURS PRN
COMMUNITY

## 2022-11-07 NOTE — PROGRESS NOTES
"     Follow Up Office Visit      Patient Name: Jonnie Blanton  : 1960   MRN: [id number]     Chief Complaint:    Chief Complaint   Patient presents with   • Follow-up     Following up for migraines.        History of Present Illness: Jonnie Blanton is a 62 y.o. male who is here today to follow up with migraines and was last seen on 2021, when he got Botox injections.  He is no longer getting Botox injections because his insurance will no longer approve them; Botox therapy worked more than any other migraine treatment therapy he has tried.  He is taking Emgality 120mg SC monthly (samples because his insurance company will not approve that medication).  He was taking Qulipta 60mg and feels it helped quite a bit.  He had significant improvement in his migraine intensity, duration and frequency when he was taking the Qulipta.  His wife states, \"He was at least productive and moving around when he was taking the Qulipta, he's had several days where he just stays in bed because his head hurts, now that he is out of Qulipta\".  Additional risk factors- elevated BMI, untreated significant ADOLFO, asthma, cigarette smoker, GERD, chronic back pain- back surgeries, diabetes type 2, dyslipidemia, HTN.     Preventative medications tried: Topiramate no improvement, Emgality, Propranolol caused dizziness, Nortriptyline, Aimovig no improvement, Ajovy with some improvement for 30 days following the 90-day injection, Propranolol  Abortive medications tried: Ubrelvy did help, Sumatriptan no improvement, Tramadol, Maxalt no improvement, Tylenol, Ibuprofen, Lortab    Following taken from previous visit note:  Jonnie Blanton is a 61 y.o. male who is here today for Botox injections for migraines and his last set of injections were on 2021.  He had about 30% improvement in his migraines over the past 3 months.  He has had as much as 75-80% improvement in his migraines, with Botox injections, in the past.  He feels the Botox " injections wear off about 6-8 weeks after his Botox administration.  He had a migraine that lasted for 4 days last week.  Currently on Emgality injection monthly (using samples).  He is using samples of Ubrelvy as abortive therapy.     Subjective      Review of Systems:   Review of Systems   Constitutional: Negative for chills, fatigue and fever.   HENT: Negative for facial swelling, hearing loss, sore throat, tinnitus and trouble swallowing.    Eyes: Negative for blurred vision, double vision, photophobia and visual disturbance.   Respiratory: Negative for cough, chest tightness and shortness of breath.    Cardiovascular: Negative for chest pain, palpitations and leg swelling.   Gastrointestinal: Negative for abdominal pain, nausea and vomiting.   Endocrine: Negative for cold intolerance and heat intolerance.   Musculoskeletal: Positive for back pain. Negative for gait problem, neck pain and neck stiffness.   Skin: Negative for color change and rash.   Allergic/Immunologic: Negative for environmental allergies and food allergies.   Neurological: Positive for headache. Negative for dizziness, syncope, speech difficulty, weakness, light-headedness, numbness and memory problem.   Psychiatric/Behavioral: Positive for sleep disturbance. Negative for behavioral problems and depressed mood. The patient is not nervous/anxious.        I have reviewed and the following portions of the patient's history were updated as appropriate: past family history, past medical history, past social history, past surgical history and problem list.    Medications:     Current Outpatient Medications:   •  albuterol sulfate  (90 Base) MCG/ACT inhaler, Inhale 2 puffs Every 4 (Four) Hours As Needed for Shortness of Air., Disp: , Rfl:   •  albuterol (PROVENTIL) (2.5 MG/3ML) 0.083% nebulizer solution, Take 2.5 mg by nebulization Every 4 (Four) Hours As Needed for Wheezing., Disp: , Rfl:   •  Albuterol (VENTOLIN IN), Inhale 90 mcg., Disp: ,  Rfl:   •  Atogepant (Qulipta) 30 MG tablet, Take 1 tablet by mouth Daily., Disp: 28 tablet, Rfl: 0  •  Atogepant (Qulipta) 60 MG tablet, Take 1 tablet by mouth Daily., Disp: 16 tablet, Rfl: 0  •  Atogepant 60 MG tablet, Take 1 tablet by mouth Daily., Disp: 30 tablet, Rfl: 5  •  Azelastine HCl 137 MCG/SPRAY solution, , Disp: , Rfl:   •  cetirizine (zyrTEC) 10 MG tablet, , Disp: , Rfl:   •  cyclobenzaprine (FLEXERIL) 10 MG tablet, , Disp: , Rfl:   •  DULoxetine (CYMBALTA) 30 MG capsule, , Disp: , Rfl:   •  famotidine (PEPCID) 20 MG tablet, , Disp: , Rfl:   •  finasteride (PROSCAR) 5 MG tablet, , Disp: , Rfl:   •  fluticasone (FLOVENT HFA) 110 MCG/ACT inhaler, Inhale 1 puff 2 (Two) Times a Day., Disp: , Rfl:   •  FREESTYLE LITE test strip, , Disp: , Rfl:   •  gabapentin (NEURONTIN) 800 MG tablet, , Disp: , Rfl:   •  galcanezumab-gnlm (EMGALITY) 120 MG/ML auto-injector pen, Inject 1 mL under the skin into the appropriate area as directed Every 28 (Twenty-Eight) Days., Disp: 1.12 mL, Rfl: 0  •  galcanezumab-gnlm (EMGALITY) 120 MG/ML auto-injector pen, Inject 1 mL under the skin into the appropriate area as directed 1 (One) Time for 1 dose., Disp: 3.36 mL, Rfl: 0  •  glucose blood test strip, FreeStyle Lite Strips, Disp: , Rfl:   •  meloxicam (MOBIC) 15 MG tablet, Take 15 mg by mouth Daily., Disp: , Rfl:   •  metFORMIN (GLUCOPHAGE) 500 MG tablet, , Disp: , Rfl:   •  propranolol (INDERAL) 20 MG tablet, , Disp: , Rfl:   •  rosuvastatin (CRESTOR) 10 MG tablet, , Disp: , Rfl:   •  tamsulosin (FLOMAX) 0.4 MG capsule 24 hr capsule, , Disp: , Rfl:   •  traMADol (ULTRAM) 50 MG tablet, , Disp: , Rfl:   •  ubrogepant (ubrogepant) 100 MG tablet, Take 1 tablet by mouth As Needed (severe migraine)., Disp: 15 tablet, Rfl: 5  •  ubrogepant (ubrogepant) 100 MG tablet, Take 1 tablet by mouth As Needed (as needed)., Disp: 2 tablet, Rfl: 0  •  ubrogepant (ubrogepant) 50 MG tablet, Take 1 tablet by mouth As Needed (as needed)., Disp: 6  tablet, Rfl: 0    Allergies:   Allergies   Allergen Reactions   • Advil [Ibuprofen] Other (See Comments)     NUMBNESS OF TONGUE AND LIPS.   • Codeine Other (See Comments)     PATIENT STATES NUMBNESS IN TONGUE AND LIPS. VERY HYPER    • Tylenol [Acetaminophen] Other (See Comments)     NUMBNESS OF LIPS AND TOUNGE.   • Banana Rash     Numbness of tongue and lips   • Citric Acid Unknown (See Comments)   • Latex Rash   • Meperidine Other (See Comments)     Becomes very hyper; causes numbness of tongue       Objective     Physical Exam:  Vital Signs:   Vitals:    11/07/22 1426   Pulse: 54   Resp: 12   Temp: 98.1 °F (36.7 °C)   TempSrc: Temporal   SpO2: 97%     There is no height or weight on file to calculate BMI.    Physical Exam  Vitals and nursing note reviewed.   Constitutional:       General: He is not in acute distress.     Appearance: Normal appearance. He is well-developed. He is obese. He is not diaphoretic.      Comments: Ambulatory via electric wheelchair.    HENT:      Head: Normocephalic and atraumatic.   Eyes:      Extraocular Movements: Extraocular movements intact.      Conjunctiva/sclera: Conjunctivae normal.      Pupils: Pupils are equal, round, and reactive to light.   Pulmonary:      Effort: Pulmonary effort is normal. No respiratory distress.   Skin:     General: Skin is warm and dry.      Findings: No rash.   Neurological:      Mental Status: He is alert and oriented to person, place, and time.   Psychiatric:         Mood and Affect: Mood normal.         Behavior: Behavior normal.         Thought Content: Thought content normal.         Judgment: Judgment normal.         Neurologic Exam     Mental Status   Oriented to person, place, and time.     Cranial Nerves     CN III, IV, VI   Pupils are equal, round, and reactive to light.       Assessment / Plan      Assessment/Plan:   Diagnoses and all orders for this visit:    1. Intractable chronic migraine without aura and without status migrainosus  (Primary)    2. BMI 37.0-37.9, adult    Other orders  -     Atogepant (Qulipta) 30 MG tablet; Take 1 tablet by mouth Daily.  Dispense: 28 tablet; Refill: 0  -     galcanezumab-gnlm (EMGALITY) 120 MG/ML auto-injector pen; Inject 1 mL under the skin into the appropriate area as directed 1 (One) Time for 1 dose.  Dispense: 3.36 mL; Refill: 0    *I believe it would be unethical for his insurance company to refuse to cover the Qulipta, Emgality and/or Botox therapy for chronic migraines, since he has marked improvement in his chronic migraines with both therapies.  He has tried multiple other preventative and abortive therapies for chronic migraines without improvement.    *Samples of Emgality and Atogepant provided today.     Follow Up:   Return in about 3 months (around 2/7/2023) for Follow Up.    CASEY Schreiber, FNP-C  [facility]rology and Sleep Medicine       Please note that portions of this note may have been completed with a voice recognition program. Efforts were made to edit the dictations, but occasionally words are mistranscribed.

## 2022-11-08 ENCOUNTER — TELEPHONE (OUTPATIENT)
Dept: NEUROLOGY | Facility: CLINIC | Age: 62
End: 2022-11-08

## 2022-11-08 DIAGNOSIS — G43.719 INTRACTABLE CHRONIC MIGRAINE WITHOUT AURA AND WITHOUT STATUS MIGRAINOSUS: Primary | ICD-10-CM

## 2023-02-06 ENCOUNTER — OFFICE VISIT (OUTPATIENT)
Dept: NEUROLOGY | Facility: CLINIC | Age: 63
End: 2023-02-06
Payer: MEDICAID

## 2023-02-06 VITALS
BODY MASS INDEX: 36.29 KG/M2 | DIASTOLIC BLOOD PRESSURE: 86 MMHG | HEART RATE: 54 BPM | SYSTOLIC BLOOD PRESSURE: 176 MMHG | HEIGHT: 69 IN | OXYGEN SATURATION: 97 % | WEIGHT: 245 LBS | TEMPERATURE: 97.3 F

## 2023-02-06 DIAGNOSIS — G43.719 INTRACTABLE CHRONIC MIGRAINE WITHOUT AURA AND WITHOUT STATUS MIGRAINOSUS: Primary | ICD-10-CM

## 2023-02-06 PROCEDURE — 99214 OFFICE O/P EST MOD 30 MIN: CPT | Performed by: NURSE PRACTITIONER

## 2023-02-06 RX ORDER — AMOXICILLIN AND CLAVULANATE POTASSIUM 875; 125 MG/1; MG/1
TABLET, FILM COATED ORAL
COMMUNITY
Start: 2022-12-29 | End: 2023-02-06

## 2023-02-06 RX ORDER — DIHYDROERGOTAMINE MESYLATE 4 MG/ML
0.72 SPRAY, METERED NASAL AS NEEDED
Qty: 0.725 ML | Refills: 0 | COMMUNITY
Start: 2023-02-06

## 2023-02-06 RX ORDER — MONTELUKAST SODIUM 10 MG/1
1 TABLET ORAL DAILY
COMMUNITY
Start: 2023-01-26

## 2023-02-06 NOTE — PROGRESS NOTES
Follow Up Office Visit      Patient Name: Jonnie Blanton  : 1960   MRN: [id number]     Chief Complaint:    Chief Complaint   Patient presents with   • Follow-up     Patient in office for follow up on migraine   Patient states headaches have not lessened and medication isn't helping.        History of Present Illness: Jonnie Blanton is a 62 y.o. male who is here today to follow up with migraines and was last seen on 2022.  He is taking Emgality 120mg SC monthly and Atogepant 60mg daily- feels his migraines are about the same.  He has a headache almost daily () and he has had at least 16 migraine days in the past month- migraines debilitating and he spends most of the days in bed.  Botox worked better than anything he has ever tried- he had 50-65% improvement in his chronic migraines with that therapy.   Additional risk factors- BMI 36, untreated significant ADOLFO, asthma, cigarette smoker, GERD, chronic back pain- back surgeries, diabetes type 2, dyslipidemia, HTN.   *Therapies previously tried for migraines- Topiramate no improvement, Emgality, Propranolol caused dizziness, Nortriptyline, Aimovig no improvement, Ajovy with some improvement,   Ubrelvy does help but insurance refused to pay for it, Sumatriptan no improvement, Tramadol, Maxalt no improvement, Tylenol, Ibuprofen, Lortab.     Following taken from previous visit note:  Jonnie Blanton is a 62 y.o. male who is here today to follow up with migraines and was last seen on 2021, when he got Botox injections.  He is no longer getting Botox injections because his insurance will no longer approve them; Botox therapy worked more than any other migraine treatment therapy he has tried.  He is taking Emgality 120mg SC monthly (samples because his insurance company will not approve that medication).  He was taking Qulipta 60mg and feels it helped quite a bit.  He had significant improvement in his migraine intensity, duration and frequency when  "he was taking the Qulipta.  His wife states, \"He was at least productive and moving around when he was taking the Qulipta, he's had several days where he just stays in bed because his head hurts, now that he is out of Qulipta\".  Additional risk factors- elevated BMI, untreated significant ADOLFO, asthma, cigarette smoker, GERD, chronic back pain- back surgeries, diabetes type 2, dyslipidemia, HTN.     Subjective      Review of Systems:   Review of Systems   Constitutional: Negative for chills, fatigue and fever.   HENT: Negative for facial swelling, hearing loss, sore throat, tinnitus and trouble swallowing.    Eyes: Negative for blurred vision, double vision, photophobia and visual disturbance.   Respiratory: Negative for cough, chest tightness and shortness of breath.    Cardiovascular: Negative for chest pain, palpitations and leg swelling.   Gastrointestinal: Negative for abdominal pain, nausea and vomiting.   Endocrine: Negative for cold intolerance and heat intolerance.   Musculoskeletal: Negative for gait problem, neck pain and neck stiffness.   Skin: Negative for color change and rash.   Allergic/Immunologic: Negative for environmental allergies and food allergies.   Neurological: Positive for headache. Negative for dizziness, syncope, speech difficulty, weakness, light-headedness, numbness and memory problem.   Psychiatric/Behavioral: Negative for behavioral problems, sleep disturbance and depressed mood. The patient is not nervous/anxious.        I have reviewed and the following portions of the patient's history were updated as appropriate: past family history, past medical history, past social history, past surgical history and problem list.    Medications:     Current Outpatient Medications:   •  albuterol (PROVENTIL) (2.5 MG/3ML) 0.083% nebulizer solution, Take 2.5 mg by nebulization Every 4 (Four) Hours As Needed for Wheezing., Disp: , Rfl:   •  Albuterol (VENTOLIN IN), Inhale 90 mcg., Disp: , Rfl:   •  " albuterol sulfate  (90 Base) MCG/ACT inhaler, Inhale 2 puffs Every 4 (Four) Hours As Needed for Shortness of Air., Disp: , Rfl:   •  Atogepant (Qulipta) 60 MG tablet, Take 1 tablet by mouth Daily., Disp: 16 tablet, Rfl: 0  •  Atogepant 60 MG tablet, Take 1 tablet by mouth Daily., Disp: 30 tablet, Rfl: 5  •  Azelastine HCl 137 MCG/SPRAY solution, , Disp: , Rfl:   •  cetirizine (zyrTEC) 10 MG tablet, , Disp: , Rfl:   •  cyclobenzaprine (FLEXERIL) 10 MG tablet, , Disp: , Rfl:   •  DULoxetine (CYMBALTA) 30 MG capsule, , Disp: , Rfl:   •  famotidine (PEPCID) 20 MG tablet, , Disp: , Rfl:   •  finasteride (PROSCAR) 5 MG tablet, , Disp: , Rfl:   •  fluticasone (FLOVENT HFA) 110 MCG/ACT inhaler, Inhale 1 puff 2 (Two) Times a Day., Disp: , Rfl:   •  FREESTYLE LITE test strip, , Disp: , Rfl:   •  gabapentin (NEURONTIN) 800 MG tablet, , Disp: , Rfl:   •  galcanezumab-gnlm (EMGALITY) 120 MG/ML auto-injector pen, Inject 1 mL under the skin into the appropriate area as directed Every 28 (Twenty-Eight) Days., Disp: 1.12 mL, Rfl: 0  •  glucose blood test strip, FreeStyle Lite Strips, Disp: , Rfl:   •  meloxicam (MOBIC) 15 MG tablet, Take 15 mg by mouth Daily., Disp: , Rfl:   •  metFORMIN (GLUCOPHAGE) 500 MG tablet, , Disp: , Rfl:   •  montelukast (SINGULAIR) 10 MG tablet, Take 1 tablet by mouth Daily., Disp: , Rfl:   •  propranolol (INDERAL) 20 MG tablet, , Disp: , Rfl:   •  rosuvastatin (CRESTOR) 10 MG tablet, , Disp: , Rfl:   •  tamsulosin (FLOMAX) 0.4 MG capsule 24 hr capsule, , Disp: , Rfl:   •  traMADol (ULTRAM) 50 MG tablet, , Disp: , Rfl:   •  ubrogepant (ubrogepant) 100 MG tablet, Take 1 tablet by mouth As Needed (severe migraine)., Disp: 15 tablet, Rfl: 5  •  Dihydroergotamine Mesylate HFA (Trudhesa) 0.725 MG/ACT aerosol solution, 0.725 mg into the nostril(s) as directed by provider As Needed (ONE SPRAY OF 0.725 MG INTO EACH NOSTRIL. MAY REPEAT 1 HOUR AFTER THE FIRST DOSE. NO MORE THAN 2 DOSES WITHIN A 24 HOUR PERIOD  "OR 3 DOSES WITHIN A 7 DAY PERIOD.)., Disp: 0.725 mL, Rfl: 0  •  ubrogepant (ubrogepant) 100 MG tablet, Take 1 tablet by mouth As Needed (as needed)., Disp: 2 tablet, Rfl: 0  •  ubrogepant (ubrogepant) 50 MG tablet, Take 1 tablet by mouth As Needed (as needed)., Disp: 6 tablet, Rfl: 0  •  ubrogepant 100 MG tablet, Take 1 tablet by mouth 1 (One) Time for 1 dose., Disp: 6 tablet, Rfl: 0    Allergies:   Allergies   Allergen Reactions   • Advil [Ibuprofen] Other (See Comments)     NUMBNESS OF TONGUE AND LIPS.   • Codeine Other (See Comments)     PATIENT STATES NUMBNESS IN TONGUE AND LIPS. VERY HYPER    • Tylenol [Acetaminophen] Other (See Comments)     NUMBNESS OF LIPS AND TOUNGE.   • Banana Rash     Numbness of tongue and lips   • Citric Acid Unknown (See Comments)   • Latex Rash   • Meperidine Other (See Comments)     Becomes very hyper; causes numbness of tongue       Objective     Physical Exam:  Vital Signs:   Vitals:    02/06/23 1420   BP: 176/86   BP Location: Left arm   Patient Position: Sitting   Cuff Size: Adult   Pulse: 54   Temp: 97.3 °F (36.3 °C)   TempSrc: Temporal   SpO2: 97%   Weight: 111 kg (245 lb)  Comment: could not obtain today, from last apt with pcp   Height: 175.3 cm (69.02\")   PainSc: 10-Worst pain ever   PainLoc: Head  Comment: migraine     Body mass index is 36.16 kg/m².    Physical Exam  Vitals and nursing note reviewed.   Constitutional:       General: He is not in acute distress.     Appearance: He is well-developed. He is obese. He is not diaphoretic.   HENT:      Head: Normocephalic and atraumatic.   Eyes:      Extraocular Movements: Extraocular movements intact.      Conjunctiva/sclera: Conjunctivae normal.      Pupils: Pupils are equal, round, and reactive to light.   Pulmonary:      Effort: Pulmonary effort is normal. No respiratory distress.      Breath sounds: Normal breath sounds.   Skin:     General: Skin is dry.      Findings: No rash.   Neurological:      Mental Status: He is " "alert and oriented to person, place, and time.   Psychiatric:         Mood and Affect: Mood normal.         Behavior: Behavior normal.         Thought Content: Thought content normal.         Judgment: Judgment normal.         Neurologic Exam     Mental Status   Oriented to person, place, and time.     Cranial Nerves     CN III, IV, VI   Pupils are equal, round, and reactive to light.       Assessment / Plan      Assessment/Plan:   Diagnoses and all orders for this visit:    1. Intractable chronic migraine without aura and without status migrainosus (Primary)  -     Ambulatory Referral to Disease State Management    2. BMI 36.0-36.9,adult    Other orders  -     Dihydroergotamine Mesylate HFA (Trudhesa) 0.725 MG/ACT aerosol solution; 0.725 mg into the nostril(s) as directed by provider As Needed (ONE SPRAY OF 0.725 MG INTO EACH NOSTRIL. MAY REPEAT 1 HOUR AFTER THE FIRST DOSE. NO MORE THAN 2 DOSES WITHIN A 24 HOUR PERIOD OR 3 DOSES WITHIN A 7 DAY PERIOD.).  Dispense: 0.725 mL; Refill: 0  -     ubrogepant 100 MG tablet; Take 1 tablet by mouth 1 (One) Time for 1 dose.  Dispense: 6 tablet; Refill: 0    *Referral to [facility] for Botox therapy since he had the most improvement with it I think it would be unethical for his insurance company to refuse to cover this therapy.    *Indications and possible SEs of Trudhesa discussed with patient.   *I have advised patient his migraines and headaches can be made worse by untreated significant ADOLFO. He says he can't have the sleep study done \"because my asthma\".  He also says there is no way he could wear a PCP machine, due to severe claustrophobia.     Follow Up:   Return in about 3 months (around 5/6/2023) for Follow Up. or sooner at [facility] if Botox approved.     CASEY Schreiber, FNP-C  [facility]       Please note that portions of this note may have been completed with a voice recognition program. Efforts were made to " edit the dictations, but occasionally words are mistranscribed.

## 2023-03-01 RX ORDER — ATOGEPANT 60 MG/1
TABLET ORAL
Qty: 30 TABLET | Refills: 5 | Status: SHIPPED | OUTPATIENT
Start: 2023-03-01

## 2023-05-25 ENCOUNTER — OFFICE VISIT (OUTPATIENT)
Dept: NEUROLOGY | Facility: CLINIC | Age: 63
End: 2023-05-25
Payer: MEDICAID

## 2023-05-25 VITALS
DIASTOLIC BLOOD PRESSURE: 80 MMHG | HEART RATE: 50 BPM | OXYGEN SATURATION: 97 % | BODY MASS INDEX: 36.16 KG/M2 | SYSTOLIC BLOOD PRESSURE: 110 MMHG | HEIGHT: 69 IN | TEMPERATURE: 97.3 F

## 2023-05-25 DIAGNOSIS — G43.719 INTRACTABLE CHRONIC MIGRAINE WITHOUT AURA AND WITHOUT STATUS MIGRAINOSUS: Primary | ICD-10-CM

## 2023-05-25 DIAGNOSIS — G43.909 ACUTE MIGRAINE: ICD-10-CM

## 2023-05-25 PROCEDURE — 99214 OFFICE O/P EST MOD 30 MIN: CPT | Performed by: NURSE PRACTITIONER

## 2023-05-25 PROCEDURE — 1160F RVW MEDS BY RX/DR IN RCRD: CPT | Performed by: NURSE PRACTITIONER

## 2023-05-25 PROCEDURE — 1159F MED LIST DOCD IN RCRD: CPT | Performed by: NURSE PRACTITIONER

## 2023-05-25 RX ORDER — DIHYDROERGOTAMINE MESYLATE 4 MG/ML
1.45 SPRAY, METERED NASAL AS NEEDED
Qty: 8 ML | Refills: 5 | Status: SHIPPED | OUTPATIENT
Start: 2023-05-25

## 2023-05-25 NOTE — PROGRESS NOTES
Follow Up Office Visit      Patient Name: Jonnie Blanton  : 1960   MRN: [id number]     Chief Complaint:    Chief Complaint   Patient presents with   • Follow-up     Patient in office to follow up on migraines.        History of Present Illness: Jonnie Blanton is a 62 y.o. male who is here today to follow up with migraines and was last seen on 2023.  He is accompanied by his wife again today.  He would like to do Botox injections again since he had 50%-65% improvement in chronic migraines with that therapy.  Has a headache almost daily with at least 16-17 migraine days per month.  Quality of life was improved with Botox injections.  He did try Trudhesa and had some relief with that- seemed to decrease the migraine intensity.  Taking Qulipta 60mg daily- very little improvement, doesn't seem to last long enough.  Taking Emgality 120mg SC monthly samples with about 2.5 weeks of improvement in migraine intensity and frequency.    *Therapies previously tried for migraines- Topiramate no improvement, Emgality, Propranolol caused dizziness, Nortriptyline, Aimovig no improvement, Ajovy with some improvement, Ubrelvy does help but insurance refused to pay for it, Sumatriptan no improvement, Tramadol, Maxalt no improvement, Tylenol, Ibuprofen, Lortab.      Following taken from previous visit note:  Jonnie Blanton is a 62 y.o. male who is here today to follow up with migraines and was last seen on 2022.  He is taking Emgality 120mg SC monthly and Atogepant 60mg daily- feels his migraines are about the same.  He has a headache almost daily () and he has had at least 16 migraine days in the past month- migraines debilitating and he spends most of the days in bed.  Botox worked better than anything he has ever tried- he had 50-65% improvement in his chronic migraines with that therapy.   Additional risk factors- BMI 36, untreated significant ADOLFO, asthma, cigarette smoker, GERD, chronic back pain- back  surgeries, diabetes type 2, dyslipidemia, HTN.   *Therapies previously tried for migraines- Topiramate no improvement, Emgality, Propranolol caused dizziness, Nortriptyline, Aimovig no improvement, Ajovy with some improvement,   Ubrelvy does help but insurance refused to pay for it, Sumatriptan no improvement, Tramadol, Maxalt no improvement, Tylenol, Ibuprofen, Lortab.      Subjective      Review of Systems:   Review of Systems   Neurological: Positive for headache.       I have reviewed and the following portions of the patient's history were updated as appropriate: past family history, past medical history, past social history, past surgical history and problem list.    Medications:     Current Outpatient Medications:   •  albuterol (PROVENTIL) (2.5 MG/3ML) 0.083% nebulizer solution, Take 2.5 mg by nebulization Every 4 (Four) Hours As Needed for Wheezing., Disp: , Rfl:   •  Albuterol (VENTOLIN IN), Inhale 90 mcg., Disp: , Rfl:   •  albuterol sulfate  (90 Base) MCG/ACT inhaler, Inhale 2 puffs Every 4 (Four) Hours As Needed for Shortness of Air., Disp: , Rfl:   •  Atogepant (Qulipta) 60 MG tablet, Take 1 tablet by mouth Daily., Disp: 16 tablet, Rfl: 0  •  Azelastine HCl 137 MCG/SPRAY solution, , Disp: , Rfl:   •  cetirizine (zyrTEC) 10 MG tablet, , Disp: , Rfl:   •  cyclobenzaprine (FLEXERIL) 10 MG tablet, , Disp: , Rfl:   •  Dihydroergotamine Mesylate HFA (Trudhesa) 0.725 MG/ACT aerosol solution, 0.725 mg into the nostril(s) as directed by provider As Needed (ONE SPRAY OF 0.725 MG INTO EACH NOSTRIL. MAY REPEAT 1 HOUR AFTER THE FIRST DOSE. NO MORE THAN 2 DOSES WITHIN A 24 HOUR PERIOD OR 3 DOSES WITHIN A 7 DAY PERIOD.)., Disp: 0.725 mL, Rfl: 0  •  DULoxetine (CYMBALTA) 30 MG capsule, , Disp: , Rfl:   •  famotidine (PEPCID) 20 MG tablet, , Disp: , Rfl:   •  finasteride (PROSCAR) 5 MG tablet, , Disp: , Rfl:   •  gabapentin (NEURONTIN) 800 MG tablet, , Disp: , Rfl:   •  galcanezumab-gnlm (EMGALITY) 120 MG/ML  auto-injector pen, Inject 1 mL under the skin into the appropriate area as directed Every 28 (Twenty-Eight) Days., Disp: 1.12 mL, Rfl: 11  •  meloxicam (MOBIC) 15 MG tablet, Take 1 tablet by mouth Daily., Disp: , Rfl:   •  metFORMIN (GLUCOPHAGE) 500 MG tablet, , Disp: , Rfl:   •  montelukast (SINGULAIR) 10 MG tablet, Take 1 tablet by mouth Daily., Disp: , Rfl:   •  propranolol (INDERAL) 20 MG tablet, , Disp: , Rfl:   •  Qulipta 60 MG tablet, TAKE 1 TABLET BY MOUTH DAILY., Disp: 30 tablet, Rfl: 5  •  rosuvastatin (CRESTOR) 10 MG tablet, , Disp: , Rfl:   •  tamsulosin (FLOMAX) 0.4 MG capsule 24 hr capsule, , Disp: , Rfl:   •  traMADol (ULTRAM) 50 MG tablet, , Disp: , Rfl:   •  ubrogepant (ubrogepant) 100 MG tablet, Take 1 tablet by mouth As Needed (severe migraine)., Disp: 15 tablet, Rfl: 5  •  Dihydroergotamine Mesylate HFA (Trudhesa) 0.725 MG/ACT aerosol solution, 1.45 mg into the nostril(s) as directed by provider As Needed (migraine)., Disp: 8 mL, Rfl: 5  •  fluticasone (FLOVENT HFA) 110 MCG/ACT inhaler, Inhale 1 puff 2 (Two) Times a Day., Disp: , Rfl:   •  FREESTYLE LITE test strip, , Disp: , Rfl:   •  galcanezumab-gnlm (EMGALITY) 120 MG/ML auto-injector pen, Inject 1 mL under the skin into the appropriate area as directed 1 (One) Time for 1 dose., Disp: 1.12 mL, Rfl: 0  •  glucose blood test strip, FreeStyle Lite Strips, Disp: , Rfl:     Allergies:   Allergies   Allergen Reactions   • Advil [Ibuprofen] Other (See Comments)     NUMBNESS OF TONGUE AND LIPS.   • Codeine Other (See Comments)     PATIENT STATES NUMBNESS IN TONGUE AND LIPS. VERY HYPER    • Tylenol [Acetaminophen] Other (See Comments)     NUMBNESS OF LIPS AND TOUNGE.   • Banana Rash     Numbness of tongue and lips   • Citric Acid Unknown (See Comments)   • Latex Rash   • Meperidine Other (See Comments)     Becomes very hyper; causes numbness of tongue       Objective     Physical Exam:  Vital Signs:   Vitals:    05/25/23 1427   BP: 110/80   BP  "Location: Right arm   Patient Position: Sitting   Cuff Size: Adult   Pulse: 50   Temp: 97.3 °F (36.3 °C)   SpO2: 97%   Weight: Comment: unable to obtain   Height: 175.3 cm (69.02\")   PainSc: 10-Worst pain ever   PainLoc: Head     Body mass index is 36.16 kg/m².    Physical Exam  Vitals and nursing note reviewed.   Constitutional:       General: He is not in acute distress.     Appearance: He is well-developed. He is not diaphoretic.      Comments: Ambulatory via electric wheelchair.    HENT:      Head: Normocephalic and atraumatic.   Eyes:      Extraocular Movements: Extraocular movements intact.      Conjunctiva/sclera: Conjunctivae normal.   Pulmonary:      Effort: Pulmonary effort is normal. No respiratory distress.   Skin:     General: Skin is warm and dry.      Findings: No rash.   Neurological:      Mental Status: He is alert and oriented to person, place, and time.   Psychiatric:         Mood and Affect: Mood normal.         Behavior: Behavior normal.         Thought Content: Thought content normal.         Judgment: Judgment normal.      Comments: Sunglasses on          Neurologic Exam     Mental Status   Oriented to person, place, and time.        Assessment / Plan      Assessment/Plan:   Diagnoses and all orders for this visit:    1. Intractable chronic migraine without aura and without status migrainosus (Primary)  -     galcanezumab-gnlm (EMGALITY) 120 MG/ML auto-injector pen; Inject 1 mL under the skin into the appropriate area as directed Every 28 (Twenty-Eight) Days.  Dispense: 1.12 mL; Refill: 11    2. Acute migraine  -     Dihydroergotamine Mesylate HFA (Trudhesa) 0.725 MG/ACT aerosol solution; 1.45 mg into the nostril(s) as directed by provider As Needed (migraine).  Dispense: 8 mL; Refill: 5    Other orders  -     galcanezumab-gnlm (EMGALITY) 120 MG/ML auto-injector pen; Inject 1 mL under the skin into the appropriate area as directed 1 (One) Time for 1 dose.  Dispense: 1.12 mL; Refill: 0    *Will " provide sample of Botox in a couple of weeks- while waiting on Botox approval.     Follow Up:   No follow-ups on file.    CASEY Schreiber, FNP-C  [facility]

## 2023-06-09 ENCOUNTER — PROCEDURE VISIT (OUTPATIENT)
Dept: NEUROLOGY | Facility: CLINIC | Age: 63
End: 2023-06-09
Payer: MEDICAID

## 2023-06-09 VITALS
SYSTOLIC BLOOD PRESSURE: 126 MMHG | HEART RATE: 59 BPM | TEMPERATURE: 97.1 F | BODY MASS INDEX: 36.29 KG/M2 | HEIGHT: 69 IN | WEIGHT: 245 LBS | OXYGEN SATURATION: 96 % | DIASTOLIC BLOOD PRESSURE: 82 MMHG

## 2023-06-09 DIAGNOSIS — G43.719 INTRACTABLE CHRONIC MIGRAINE WITHOUT AURA AND WITHOUT STATUS MIGRAINOSUS: Primary | ICD-10-CM

## 2023-06-09 DIAGNOSIS — G43.909 ACUTE MIGRAINE: ICD-10-CM

## 2023-06-09 NOTE — PROGRESS NOTES
Botox Procedure Note       Patient Name: Jonnie Blanton  : 1960   MRN: [id number]     Chief Complaint:    Chief Complaint   Patient presents with    Procedure     In office botox sample for migraines       History of Present Illness: Jonnie Blanton is a 62 y.o. male who is here today for Botox injections for migraines.  He had Botox injections, in the past, and had significant improvement in his migraines for at least 10 weeks.  However, the out of pocket cost for further Botox has been too great.     We have discussed risk and benefits of this Botox procedure and common side effects including headache, neck pain, neck stiffness or weakness, ptosis, flu-like symptoms as well as more serious possible adverse effects including possible dysphagia, respiratory distress or even death (death has only been reported once with adults for Botox for migraines in another state when mixed with lidocaine solution which we do not use lidocaine solution in our practice for mixing Botox). The patient verbally understands and accepts risks and agrees with moving forward with Botox injections for chronic migraine prevention.    Verbal and written consent has been obtained from the patient prior to beginning treatment injections.     Subjective      Review of Systems:   Review of Systems    The following portions of the patient's history were reviewed an updated as appropriate: past family history, past medical history, past social history, past surgical history.     Medications:     Current Outpatient Medications:     albuterol (PROVENTIL) (2.5 MG/3ML) 0.083% nebulizer solution, Take 2.5 mg by nebulization Every 4 (Four) Hours As Needed for Wheezing., Disp: , Rfl:     Albuterol (VENTOLIN IN), Inhale 90 mcg., Disp: , Rfl:     albuterol sulfate  (90 Base) MCG/ACT inhaler, Inhale 2 puffs Every 4 (Four) Hours As Needed for Shortness of Air., Disp: , Rfl:     Atogepant (Qulipta) 60 MG tablet, Take 1 tablet by mouth  Daily., Disp: 16 tablet, Rfl: 0    Azelastine HCl 137 MCG/SPRAY solution, , Disp: , Rfl:     cetirizine (zyrTEC) 10 MG tablet, , Disp: , Rfl:     cyclobenzaprine (FLEXERIL) 10 MG tablet, , Disp: , Rfl:     Dihydroergotamine Mesylate HFA (Trudhesa) 0.725 MG/ACT aerosol solution, 0.725 mg into the nostril(s) as directed by provider As Needed (ONE SPRAY OF 0.725 MG INTO EACH NOSTRIL. MAY REPEAT 1 HOUR AFTER THE FIRST DOSE. NO MORE THAN 2 DOSES WITHIN A 24 HOUR PERIOD OR 3 DOSES WITHIN A 7 DAY PERIOD.)., Disp: 0.725 mL, Rfl: 0    Dihydroergotamine Mesylate HFA (Trudhesa) 0.725 MG/ACT aerosol solution, 1.45 mg into the nostril(s) as directed by provider As Needed (migraine)., Disp: 8 mL, Rfl: 5    DULoxetine (CYMBALTA) 30 MG capsule, , Disp: , Rfl:     famotidine (PEPCID) 20 MG tablet, , Disp: , Rfl:     finasteride (PROSCAR) 5 MG tablet, , Disp: , Rfl:     fluticasone (FLOVENT HFA) 110 MCG/ACT inhaler, Inhale 1 puff 2 (Two) Times a Day., Disp: , Rfl:     FREESTYLE LITE test strip, , Disp: , Rfl:     gabapentin (NEURONTIN) 800 MG tablet, , Disp: , Rfl:     galcanezumab-gnlm (EMGALITY) 120 MG/ML auto-injector pen, Inject 1 mL under the skin into the appropriate area as directed Every 28 (Twenty-Eight) Days., Disp: 1.12 mL, Rfl: 11    glucose blood test strip, FreeStyle Lite Strips, Disp: , Rfl:     meloxicam (MOBIC) 15 MG tablet, Take 1 tablet by mouth Daily., Disp: , Rfl:     metFORMIN (GLUCOPHAGE) 500 MG tablet, , Disp: , Rfl:     montelukast (SINGULAIR) 10 MG tablet, Take 1 tablet by mouth Daily., Disp: , Rfl:     propranolol (INDERAL) 20 MG tablet, , Disp: , Rfl:     Qulipta 60 MG tablet, TAKE 1 TABLET BY MOUTH DAILY., Disp: 30 tablet, Rfl: 5    rosuvastatin (CRESTOR) 10 MG tablet, , Disp: , Rfl:     tamsulosin (FLOMAX) 0.4 MG capsule 24 hr capsule, , Disp: , Rfl:     traMADol (ULTRAM) 50 MG tablet, , Disp: , Rfl:     ubrogepant (ubrogepant) 100 MG tablet, Take 1 tablet by mouth As Needed (severe migraine)., Disp: 15  "tablet, Rfl: 5    Allergies:   Allergies   Allergen Reactions    Advil [Ibuprofen] Other (See Comments)     NUMBNESS OF TONGUE AND LIPS.    Codeine Other (See Comments)     PATIENT STATES NUMBNESS IN TONGUE AND LIPS. VERY HYPER     Tylenol [Acetaminophen] Other (See Comments)     NUMBNESS OF LIPS AND TOUNGE.    Banana Rash     Numbness of tongue and lips    Citric Acid Unknown (See Comments)    Latex Rash    Meperidine Other (See Comments)     Becomes very hyper; causes numbness of tongue       Objective     Physical Exam:  Vital Signs:   Vitals:    06/09/23 1316   BP: 126/82   Pulse: 59   Temp: 97.1 °F (36.2 °C)   SpO2: 96%   Weight: 111 kg (245 lb)   Height: 175.3 cm (69\")   PainSc: 10-Worst pain ever   PainLoc: Comment: body pain     Body mass index is 36.18 kg/m².     Physical Exam    Neurologic Exam    BOTOX PROCEDURE:     Date of Last Injection: N/A  Response: N/A  Side Effects: None  : Proximic  Lot #: [id number]  Expiration Date: 10/2024  NDC: [id number]    200 unit vial Botox was re-constituted with 4 mL 0.9 NS to 5 unit/0.1 mL using standard techniques.  10 units were given at the  muscle in 2 divided sites  5 units were given at the Procerus muscle  20 units were given at the Frontalis muscle in 4 divided sites  40 units were given at the Temporalis muscle in 8 divided sites  30 units were given at the Occipitalis muscle in 6 divided sites  20 units were given at the Cervical paraspinal muscle in 4 divided sites  30 units were given at the Trapezius muscle in 6 divided sites  20 units were give at the Masseter muscles in 2 divided sites.   For a total of 175 units divided between 33 sites and 25 units of wastage    Patient tolerated procedure well with no immediate complications.     Assessment / Plan      Assessment/Plan:   Diagnoses and all orders for this visit:    1. Intractable chronic migraine without aura and without status migrainosus (Primary)    2. Acute migraine       Follow " Up:   It has been determined that Jonnie Blanton has chronic migraine headaches. We will proceed with a 12 week regimen of 200 units of Botox injections, to treat the patient's chronic migraines.      Return in about 3 months (around 9/9/2023) for Botox.      CASEY Schreiber, FNP-C  [facility]

## 2023-09-11 ENCOUNTER — PROCEDURE VISIT (OUTPATIENT)
Dept: NEUROLOGY | Facility: CLINIC | Age: 63
End: 2023-09-11

## 2023-09-11 VITALS
WEIGHT: 245 LBS | TEMPERATURE: 97.1 F | DIASTOLIC BLOOD PRESSURE: 82 MMHG | SYSTOLIC BLOOD PRESSURE: 140 MMHG | OXYGEN SATURATION: 94 % | HEART RATE: 50 BPM | HEIGHT: 69 IN | BODY MASS INDEX: 36.29 KG/M2

## 2023-09-11 DIAGNOSIS — G43.719 INTRACTABLE CHRONIC MIGRAINE WITHOUT AURA AND WITHOUT STATUS MIGRAINOSUS: Primary | ICD-10-CM

## 2023-09-11 RX ORDER — RIMEGEPANT SULFATE 75 MG/75MG
75 TABLET, ORALLY DISINTEGRATING ORAL AS NEEDED
Qty: 8 TABLET | Refills: 0 | COMMUNITY
Start: 2023-09-11